# Patient Record
Sex: FEMALE | Race: WHITE | Employment: FULL TIME | URBAN - METROPOLITAN AREA
[De-identification: names, ages, dates, MRNs, and addresses within clinical notes are randomized per-mention and may not be internally consistent; named-entity substitution may affect disease eponyms.]

---

## 2022-01-05 ENCOUNTER — OFFICE VISIT (OUTPATIENT)
Dept: NEUROLOGY | Age: 67
End: 2022-01-05
Payer: COMMERCIAL

## 2022-01-05 VITALS
OXYGEN SATURATION: 99 % | HEIGHT: 64 IN | RESPIRATION RATE: 14 BRPM | WEIGHT: 132 LBS | HEART RATE: 76 BPM | DIASTOLIC BLOOD PRESSURE: 54 MMHG | SYSTOLIC BLOOD PRESSURE: 119 MMHG | BODY MASS INDEX: 22.53 KG/M2

## 2022-01-05 DIAGNOSIS — G62.9 NEUROPATHY: Primary | ICD-10-CM

## 2022-01-05 PROCEDURE — 99204 OFFICE O/P NEW MOD 45 MIN: CPT | Performed by: SPECIALIST

## 2022-01-05 RX ORDER — BISMUTH SUBSALICYLATE 262 MG
1 TABLET,CHEWABLE ORAL DAILY
COMMUNITY

## 2022-01-05 RX ORDER — GABAPENTIN 600 MG/1
TABLET ORAL
COMMUNITY
Start: 2021-11-22 | End: 2022-01-05 | Stop reason: DRUGHIGH

## 2022-01-05 RX ORDER — TRAZODONE HYDROCHLORIDE 100 MG/1
TABLET ORAL
COMMUNITY
Start: 2021-12-17

## 2022-01-05 RX ORDER — TOPIRAMATE 50 MG/1
50 TABLET, FILM COATED ORAL 2 TIMES DAILY
COMMUNITY

## 2022-01-05 RX ORDER — GABAPENTIN 400 MG/1
CAPSULE ORAL
Qty: 180 CAPSULE | Refills: 3 | Status: SHIPPED | OUTPATIENT
Start: 2022-01-05 | End: 2022-05-06

## 2022-01-05 NOTE — PATIENT INSTRUCTIONS
Patient history reviewed patient examined. On first encounter sounds like a peripheral neuropathy of unknown etiology and would like to get Dr. Amanda Meneses notes on his work-up etc. we will increase the gabapentin to 800 mg 3 times a day to improve pain control. Revisit in about 6 to 8 weeks based on my revisit schedule. Further suggestions could follow.

## 2022-01-05 NOTE — PROGRESS NOTES
Neurology Consult      Subjective:      Erin Aviles is a 61 y.o. female who comes in today on first encounter for evaluation of neuropathy. As I understand it, she works in the [employer]. Was worked up by Dr. Korina Cali over 6 years ago with what sounds like extensive lab work and an EMG and nerve conduction etc.  I am not sure what the official diagnosis beyond neuropathy was- perhaps idiopathic? It is of interest that she does not have diabetes as I understand it, but her mother and 3 siblings do. Her bowel and bladder function is okay and describes the sensibility alterations in the lower extremities is a combination of numbness and occasional superimposed muscle cramps and burning especially at night. Does not smoke does not consume alcohol and does not do street drugs. Currently on gabapentin 600 mg 3 times daily and Topamax 50 mg twice daily is on trazodone 100 mg taking 2 at night. Says special sensory function is intact as well as bowel and bladder and bulbar function. Understand her primary care is done recent lab work. Current Outpatient Medications   Medication Sig Dispense Refill    traZODone (DESYREL) 100 mg tablet TAKE 2 TABLETS BY MOUTH EVERY DAY AT BEDTIME FOR 90 DAYS      topiramate (TOPAMAX) 50 mg tablet Take 50 mg by mouth two (2) times a day.  docusate sodium (STOOL SOFTENER PO) Take  by mouth.  multivitamin (ONE A DAY) tablet Take 1 Tablet by mouth daily.  B cmplx 4/vit D3/C/folic/zinc (VITAL-D RX PO) Take  by mouth.  OTHER       gabapentin (NEURONTIN) 400 mg capsule 2 p.o. 3 times daily  Indications: neuropathic pain 180 Capsule 3      No Known Allergies  No past medical history on file.    Past Surgical History:   Procedure Laterality Date    HX HYSTERECTOMY        Social History     Socioeconomic History    Marital status:      Spouse name: Not on file    Number of children: Not on file    Years of education: Not on file    Highest education level: Not on file   Occupational History    Not on file   Tobacco Use    Smoking status: Never Smoker    Smokeless tobacco: Never Used   Substance and Sexual Activity    Alcohol use: Not Currently    Drug use: Never    Sexual activity: Not on file   Other Topics Concern    Not on file   Social History Narrative    Not on file     Social Determinants of Health     Financial Resource Strain:     Difficulty of Paying Living Expenses: Not on file   Food Insecurity:     Worried About Running Out of Food in the Last Year: Not on file    Myranda of Food in the Last Year: Not on file   Transportation Needs:     Lack of Transportation (Medical): Not on file    Lack of Transportation (Non-Medical): Not on file   Physical Activity:     Days of Exercise per Week: Not on file    Minutes of Exercise per Session: Not on file   Stress:     Feeling of Stress : Not on file   Social Connections:     Frequency of Communication with Friends and Family: Not on file    Frequency of Social Gatherings with Friends and Family: Not on file    Attends Amish Services: Not on file    Active Member of [address] [place] [address] or Organizations: Not on file    Attends Club or Organization Meetings: Not on file    Marital Status: Not on file   Intimate Partner Violence:     Fear of Current or Ex-Partner: Not on file    Emotionally Abused: Not on file    Physically Abused: Not on file    Sexually Abused: Not on file   Housing Stability:     Unable to Pay for Housing in the Last Year: Not on file    Number of [place] in the Last Year: Not on file    Unstable Housing in the Last Year: Not on file      No family history on file.    Visit Vitals  BP (!) 119/54 (BP 1 Location: Left upper arm, BP Patient Position: Sitting)   Pulse 76   Resp 14   Ht 5' 4\" (1.626 m)   Wt 59.9 kg (132 lb)   SpO2 99%   BMI 22.66 kg/m²        Review of Systems:   A comprehensive review of systems was negative except for that written in the HPI.      Neuro Exam:     Appearance: The patient is well developed, well nourished, provides a coherent history and is in no acute distress. Mental Status: Oriented to time, place and person. Mood and affect appropriate. Cranial Nerves:   Intact visual fields. Fundi are benign. MIKEY, EOM's full, no nystagmus, no ptosis. Facial sensation is normal. Corneal reflexes are intact. Facial movement is symmetric. Hearing is normal bilaterally. Palate is midline with normal sternocleidomastoid and trapezius muscles are normal. Tongue is midline. Motor:  5/5 strength in upper and in the lower proximal and distal muscles she averages 5-/5. Normal bulk and tone. No fasciculations. Reflexes:   Deep tendon reflexes 2+/4 and symmetrical except for +1 knee jerks and with Jendrassik maneuver trace ankle jerks. Sensory:    Diminished distally to touch, pinprick and vibration. Position sense intact. Gait:   Patient has a notable right leg limp as she moves from one step to the other although she says it is her left foot that is symptomatic. It may be a type of accommodative posturing with left foot pain? Tremor:   No tremor noted. Cerebellar:  No cerebellar signs present. Neurovascular:  Normal heart sounds and regular rhythm, peripheral pulses intact, and no carotid bruits. Straight leg raising -90 degrees. No clonus no Irizarry's. Assessment:   Neuropathy no doubt idiopathic. Before suggesting any additional assessments would like to get the original work-up [facility]. Further suggestions could follow. For the time being we will increase her pain control by going from 600 mg of gabapentin to 800 mg 3 times daily.   Other drug interventions could include in addition nortriptyline Cymbalta Lamictal Trileptal etc.  Will suggest continue with the Topamax 50 mg twice daily and the dose could go higher depending on results/intolerance etc.  It is interesting that she has her mother and 3 siblings with diabetes. Would also like to get recent lab work done by primary care recently. Plan:   Revisit in about 2 months.   Signed by :  Chacha Velásquez MD

## 2022-01-05 NOTE — LETTER
1/5/2022    Patient: Law Wharton   YOB: 1955   Date of Visit: 1/5/2022     Amber Frederick NP  [address] [facility] [phone]  Via Fax: [phone]    Dear Amber Frederick NP,      Thank you for referring Ms. Law Wharton to [facility] for evaluation. My notes for this consultation are attached. If you have questions, please do not hesitate to call me. I look forward to following your patient along with you.       Sincerely,    Aung Posey MD

## 2022-03-07 ENCOUNTER — OFFICE VISIT (OUTPATIENT)
Dept: NEUROLOGY | Age: 67
End: 2022-03-07
Payer: COMMERCIAL

## 2022-03-07 VITALS
DIASTOLIC BLOOD PRESSURE: 60 MMHG | RESPIRATION RATE: 14 BRPM | WEIGHT: 132 LBS | SYSTOLIC BLOOD PRESSURE: 126 MMHG | OXYGEN SATURATION: 98 % | HEIGHT: 64 IN | TEMPERATURE: 98.5 F | HEART RATE: 70 BPM | BODY MASS INDEX: 22.53 KG/M2

## 2022-03-07 DIAGNOSIS — G62.9 NEUROPATHY: Primary | ICD-10-CM

## 2022-03-07 PROCEDURE — 99213 OFFICE O/P EST LOW 20 MIN: CPT | Performed by: SPECIALIST

## 2022-03-07 RX ORDER — NORTRIPTYLINE HYDROCHLORIDE 10 MG/1
10 CAPSULE ORAL
Qty: 30 CAPSULE | Refills: 3 | Status: SHIPPED | OUTPATIENT
Start: 2022-03-07 | End: 2022-07-06

## 2022-03-07 RX ORDER — ESTRADIOL 0.1 MG/D
PATCH, EXTENDED RELEASE TRANSDERMAL
COMMUNITY
Start: 2022-02-11

## 2022-03-07 NOTE — LETTER
3/7/2022    Patient: Dalila Beard   YOB: 1955   Date of Visit: 3/7/2022     Jess Plaza NP  [address] [facility] [phone]  Via Fax: [phone]    Dear Jess Plaza NP,      Thank you for referring Ms. Dalila Beard to [facility] for evaluation. My notes for this consultation are attached. If you have questions, please do not hesitate to call me. I look forward to following your patient along with you.       Sincerely,    Madelyn Silverman MD

## 2022-03-07 NOTE — PATIENT INSTRUCTIONS
Patient history viewed patient examined. Will see if we can add some additional pain relief in the legs with the addition of nortriptyline at night lowest dose. Was warned of sedation. Is already on gabapentin and has Topamax for headache prevention. Revisit in about 2 months.

## 2022-03-07 NOTE — PROGRESS NOTES
Neurology Consult      Subjective:      Yamil Ashby is a 61 y.o. female who comes in today with painful idiopathic polyneuropathy. Was able to get my hands on the remote neurologic work-up with Dr. Drucella Meigs and company at [facility]. Did the usual and customary blood screen for other possibilities and an EMG and nerve conduction as well. Ended up with the idiopathic label. Currently on gabapentin 800 mg 3 times daily. Still has some breakthrough discomfort with the distal legs and feet. We will see if we can add the drug nortriptyline 10 mg at night as a lowest dose and she was warned of sedation. We can go higher on the dose if needed and other possibilities include considerations to Cymbalta Trileptal Lamictal etc. revisit in about 2 months. Current Outpatient Medications   Medication Sig Dispense Refill    Erum 0.1 mg/24 hr APPLY 1 PATCH TOPICALLY TO SKIN TWICE A WEEK      nortriptyline (PAMELOR) 10 mg capsule Take 1 Capsule by mouth nightly. 30 Capsule 3    traZODone (DESYREL) 100 mg tablet TAKE 2 TABLETS BY MOUTH EVERY DAY AT BEDTIME FOR 90 DAYS      topiramate (TOPAMAX) 50 mg tablet Take 50 mg by mouth two (2) times a day.  docusate sodium (STOOL SOFTENER PO) Take  by mouth.  multivitamin (ONE A DAY) tablet Take 1 Tablet by mouth daily.  B cmplx 4/vit D3/C/folic/zinc (VITAL-D RX PO) Take  by mouth.  OTHER       gabapentin (NEURONTIN) 400 mg capsule 2 p.o. 3 times daily  Indications: neuropathic pain 180 Capsule 3      No Known Allergies  No past medical history on file.    Past Surgical History:   Procedure Laterality Date    HX HYSTERECTOMY        Social History     Socioeconomic History    Marital status:      Spouse name: Not on file    Number of children: Not on file    Years of education: Not on file    Highest education level: Not on file   Occupational History    Not on file   Tobacco Use    Smoking status: Never Smoker    Smokeless tobacco: Never Used Substance and Sexual Activity    Alcohol use: Not Currently    Drug use: Never    Sexual activity: Not on file   Other Topics Concern    Not on file   Social History Narrative    Not on file     Social Determinants of Health     Financial Resource Strain:     Difficulty of Paying Living Expenses: Not on file   Food Insecurity:     Worried About Running Out of Food in the Last Year: Not on file    Myranda of Food in the Last Year: Not on file   Transportation Needs:     Lack of Transportation (Medical): Not on file    Lack of Transportation (Non-Medical): Not on file   Physical Activity:     Days of Exercise per Week: Not on file    Minutes of Exercise per Session: Not on file   Stress:     Feeling of Stress : Not on file   Social Connections:     Frequency of Communication with Friends and Family: Not on file    Frequency of Social Gatherings with Friends and Family: Not on file    Attends Mosque Services: Not on file    Active Member of [address] [place] [address] or Organizations: Not on file    Attends Club or Organization Meetings: Not on file    Marital Status: Not on file   Intimate Partner Violence:     Fear of Current or Ex-Partner: Not on file    Emotionally Abused: Not on file    Physically Abused: Not on file    Sexually Abused: Not on file   Housing Stability:     Unable to Pay for Housing in the Last Year: Not on file    Number of [place] in the Last Year: Not on file    Unstable Housing in the Last Year: Not on file      No family history on file. Visit Vitals  /60   Pulse 70   Temp 98.5 °F (36.9 °C) (Temporal)   Resp 14   Ht 5' 4\" (1.626 m)   Wt 59.9 kg (132 lb)   SpO2 98%   BMI 22.66 kg/m²        Review of Systems:   A comprehensive review of systems was negative except for that written in the HPI. Neuro Exam:     Appearance: The patient is well developed, well nourished, provides a coherent history and is in no acute distress.    Mental Status: Oriented to time, place and person. Mood and affect appropriate. Cranial Nerves:   Intact visual fields. Fundi are benign. MIKEY, EOM's full, no nystagmus, no ptosis. Facial sensation is normal. Corneal reflexes are intact. Facial movement is symmetric. Hearing is normal bilaterally. Palate is midline with normal sternocleidomastoid and trapezius muscles are normal. Tongue is midline. Motor:  5/5 strength in upper and lower proximal and distal muscles. Normal bulk and tone. No fasciculations. Reflexes:   Deep tendon reflexes 0-1+/4 and symmetrical.   Sensory:    Diminished distally to touch, pinprick and vibration. Gait:   Patient's transfers and gait taken slow and cautious and deliberate but seemed to be functional.     Tremor:   No tremor noted. Cerebellar:  No cerebellar signs present. Neurovascular:  Normal heart sounds and regular rhythm, peripheral pulses intact, and no carotid bruits. Assessment:   Painful idiopathic polyneuropathy. We will add simple nortriptyline 10 mg at night as a preventative and was warned on sedation. We can obviously work up the dose if we need to. Other previously reference drugs for consideration could include Cymbalta Trileptal Lamictal etc.      Plan:   Revisit in about 2 months.   Signed by :  Reed Greenwood MD

## 2022-05-09 ENCOUNTER — OFFICE VISIT (OUTPATIENT)
Dept: NEUROLOGY | Age: 67
End: 2022-05-09
Payer: COMMERCIAL

## 2022-05-09 VITALS
DIASTOLIC BLOOD PRESSURE: 76 MMHG | WEIGHT: 132 LBS | RESPIRATION RATE: 18 BRPM | HEART RATE: 72 BPM | SYSTOLIC BLOOD PRESSURE: 118 MMHG | BODY MASS INDEX: 22.66 KG/M2 | OXYGEN SATURATION: 99 %

## 2022-05-09 DIAGNOSIS — G62.9 NEUROPATHY: Primary | ICD-10-CM

## 2022-05-09 PROCEDURE — 99213 OFFICE O/P EST LOW 20 MIN: CPT | Performed by: SPECIALIST

## 2022-05-09 NOTE — PATIENT INSTRUCTIONS
Patient will be awaiting a call from her eye doctor on the compatibility of our drug nortriptyline for neuropathy as it goes to her glaucoma. We will not do anything until we hear the same. The revisit will be the same- as it will be determined based on what our next step is in terms of keeping the nortriptyline or going to a different product.

## 2022-05-09 NOTE — LETTER
5/9/2022    Patient: Raj Sheriff   YOB: 1955   Date of Visit: 5/9/2022     Luciana Mares NP  [address]  Via Fax: [phone]    Dear Luciana Mares NP,      Thank you for referring Ms. Raj Sheriff to [facility] for evaluation. My notes for this consultation are attached. If you have questions, please do not hesitate to call me. I look forward to following your patient along with you.       Sincerely,    Thomas Oliveira MD

## 2022-05-09 NOTE — PROGRESS NOTES
Neurology Consult      Subjective:      Dennis Ricci is a 61 y.o. female who comes in today on follow-up of idiopathic painful neuropathy. We used lowest dose nortriptyline 10 mg as a challenge that would complement the gabapentin 800 mg 3 times daily. She thinks that it has significantly diminished the pathologic muscle cramping and was reminded that we can go much higher on the dose if it is approved by her eye doctor on the glaucoma. She will let us know as she is already placed a phone message to the physician as to whether the nortriptyline is compatible for her or not. That in turn will determine when we see her back as the revisit could go longer if we can retain the nortriptyline at a higher dose but shorter if we are forced to look at a new drug in class. She otherwise did not reference any new difficulties. Current Outpatient Medications   Medication Sig Dispense Refill    gabapentin (NEURONTIN) 400 mg capsule TAKE 2 CAPSULES BY MOUTH THREE TIMES DAILY FOR  NEUROPATHIC  PAIN 180 Capsule 0    Erum 0.1 mg/24 hr APPLY 1 PATCH TOPICALLY TO SKIN TWICE A WEEK      nortriptyline (PAMELOR) 10 mg capsule Take 1 Capsule by mouth nightly. 30 Capsule 3    traZODone (DESYREL) 100 mg tablet TAKE 2 TABLETS BY MOUTH EVERY DAY AT BEDTIME FOR 90 DAYS      topiramate (TOPAMAX) 50 mg tablet Take 50 mg by mouth two (2) times a day.  docusate sodium (STOOL SOFTENER PO) Take  by mouth.  multivitamin (ONE A DAY) tablet Take 1 Tablet by mouth daily.  B cmplx 4/vit D3/C/folic/zinc (VITAL-D RX PO) Take  by mouth.  OTHER         No Known Allergies  No past medical history on file.    Past Surgical History:   Procedure Laterality Date    HX HYSTERECTOMY        Social History     Socioeconomic History    Marital status:      Spouse name: Not on file    Number of children: Not on file    Years of education: Not on file    Highest education level: Not on file   Occupational History    Not on file   Tobacco Use    Smoking status: Never Smoker    Smokeless tobacco: Never Used   Substance and Sexual Activity    Alcohol use: Not Currently    Drug use: Never    Sexual activity: Not on file   Other Topics Concern    Not on file   Social History Narrative    Not on file     Social Determinants of Health     Financial Resource Strain:     Difficulty of Paying Living Expenses: Not on file   Food Insecurity:     Worried About Running Out of Food in the Last Year: Not on file    Myranda of Food in the Last Year: Not on file   Transportation Needs:     Lack of Transportation (Medical): Not on file    Lack of Transportation (Non-Medical): Not on file   Physical Activity:     Days of Exercise per Week: Not on file    Minutes of Exercise per Session: Not on file   Stress:     Feeling of Stress : Not on file   Social Connections:     Frequency of Communication with Friends and Family: Not on file    Frequency of Social Gatherings with Friends and Family: Not on file    Attends Faith Services: Not on file    Active Member of [address] [place] [address] or Organizations: Not on file    Attends Club or Organization Meetings: Not on file    Marital Status: Not on file   Intimate Partner Violence:     Fear of Current or Ex-Partner: Not on file    Emotionally Abused: Not on file    Physically Abused: Not on file    Sexually Abused: Not on file   Housing Stability:     Unable to Pay for Housing in the Last Year: Not on file    Number of [place] in the Last Year: Not on file    Unstable Housing in the Last Year: Not on file      No family history on file. Visit Vitals  /76 (BP 1 Location: Left arm, BP Patient Position: Sitting, BP Cuff Size: Adult)   Pulse 72   Resp 18   Wt 59.9 kg (132 lb)   SpO2 99%   BMI 22.66 kg/m²        Review of Systems:   A comprehensive review of systems was negative except for that written in the HPI. Neuro Exam:     Appearance:   The patient is well developed, well nourished, provides a coherent history and is in no acute distress. Mental Status: Oriented to time, place and person. Mood and affect appropriate. Cranial Nerves:   Intact visual fields. Fundi are benign. MIKEY, EOM's full, no nystagmus, no ptosis. Facial sensation is normal. Corneal reflexes are intact. Facial movement is symmetric. Hearing is normal bilaterally. Palate is midline with normal sternocleidomastoid and trapezius muscles are normal. Tongue is midline. Motor:  5/5 strength in upper and lower proximal and distal muscles. Normal bulk and tone. No fasciculations. Reflexes:   Deep tendon reflexes 0-1+/4 and symmetrical.   Sensory:    Diminished distally to touch, pinprick and vibration. Gait:  Normal gait but slightly cautious step to step. Tremor:   No tremor noted. Cerebellar:  No cerebellar signs present. Neurovascular:  Normal heart sounds and regular rhythm, peripheral pulses intact, and no carotid bruits. Assessment:   Idiopathic neuropathy. We will keep the nortriptyline 10 mg in place for now and the patient has a phone call into her eye doctor regarding the compatibility of that drug with her glaucoma or not. And next step will be predicated on that phone call and she says she will call us and let us know how that goes. We have plenty of room to go higher and apparently the drug has initially decreased on the uncomfortable leg spasms etc.  Otherwise exam appears to be baseline. Plan: Will pend revisit in the meantime.   Signed by :  Swathi Madrid MD

## 2022-06-06 NOTE — PROGRESS NOTES
I just received a call from Dr. Gladys Arnett the patient's eye doctor. On the last visit we had a little discussion about her medications and one of them was to the nortriptyline 10 mg she takes to help suppress neuromuscular symptoms. She is a patient with glaucoma followed by Dr. Noris Caruso. He did a check on her case and said there is currently no issue with her eyes and the nortriptyline. That is certainly reassuring as I hoped it would be. Appreciate the time for the phone call and can address that with her if it comes up again.   DEMAR BAIRES.

## 2022-08-09 DIAGNOSIS — G62.9 NEUROPATHY: ICD-10-CM

## 2022-08-09 RX ORDER — GABAPENTIN 400 MG/1
CAPSULE ORAL
Qty: 180 CAPSULE | Refills: 1 | Status: CANCELLED | OUTPATIENT
Start: 2022-08-09

## 2022-08-29 ENCOUNTER — OFFICE VISIT (OUTPATIENT)
Dept: NEUROLOGY | Age: 67
End: 2022-08-29
Payer: COMMERCIAL

## 2022-08-29 VITALS — SYSTOLIC BLOOD PRESSURE: 112 MMHG | HEART RATE: 74 BPM | DIASTOLIC BLOOD PRESSURE: 68 MMHG

## 2022-08-29 DIAGNOSIS — G62.9 NEUROPATHY: Primary | ICD-10-CM

## 2022-08-29 PROCEDURE — 99213 OFFICE O/P EST LOW 20 MIN: CPT | Performed by: SPECIALIST

## 2022-08-29 PROCEDURE — 1123F ACP DISCUSS/DSCN MKR DOCD: CPT | Performed by: SPECIALIST

## 2022-08-29 RX ORDER — NORTRIPTYLINE HYDROCHLORIDE 25 MG/1
25 CAPSULE ORAL
Qty: 30 CAPSULE | Refills: 3 | Status: SHIPPED | OUTPATIENT
Start: 2022-08-29

## 2022-08-29 RX ORDER — LATANOPROST 50 UG/ML
SOLUTION/ DROPS OPHTHALMIC
COMMUNITY
Start: 2022-06-02

## 2022-08-29 NOTE — PROGRESS NOTES
Neurology Consult      Subjective:      Nayeli Chan is a 74 y.o. female who comes in today with length dependent idiopathic painful neuropathy. So far no problem with the nortriptyline 10 mg but unfortunately no real gains in control of pain. We will increase it from 10 to 25 mg and is welcome to give me a call in about 2 weeks to see if we have accomplish anything or produced sedation and/or fatigue. Need to recall that she is already on gabapentin and a good dose of that in addition to Topamax. No history of falls. Suggestions that we catch up with each other in about 2 months. Please see other agents below that we can resort to if and as needed. Made no new commentary today as to concerns diagnoses etc. etc.         Current Outpatient Medications   Medication Sig Dispense Refill    latanoprost (XALATAN) 0.005 % ophthalmic solution INSTILL 1 DROP INTO EACH EYE ONCE DAILY AT BEDTIME      nortriptyline (PAMELOR) 25 mg capsule Take 1 Capsule by mouth nightly. 30 Capsule 3    gabapentin (NEURONTIN) 400 mg capsule TAKE 2 CAPSULES BY MOUTH THREE TIMES DAILY FOR PAIN 180 Capsule 1    Erum 0.1 mg/24 hr APPLY 1 PATCH TOPICALLY TO SKIN TWICE A WEEK      traZODone (DESYREL) 100 mg tablet TAKE 2 TABLETS BY MOUTH EVERY DAY AT BEDTIME FOR 90 DAYS      topiramate (TOPAMAX) 50 mg tablet Take 50 mg by mouth two (2) times a day. docusate sodium (STOOL SOFTENER PO) Take  by mouth.      multivitamin (ONE A DAY) tablet Take 1 Tablet by mouth daily. B cmplx 4/vit D3/C/folic/zinc (VITAL-D RX PO) Take  by mouth. OTHER         No Known Allergies  No past medical history on file.    Past Surgical History:   Procedure Laterality Date    HX HYSTERECTOMY        Social History     Socioeconomic History    Marital status:      Spouse name: Not on file    Number of children: Not on file    Years of education: Not on file    Highest education level: Not on file   Occupational History    Not on file   Tobacco Use Smoking status: Never    Smokeless tobacco: Never   Substance and Sexual Activity    Alcohol use: Not Currently    Drug use: Never    Sexual activity: Not on file   Other Topics Concern    Not on file   Social History Narrative    Not on file     Social Determinants of Health     Financial Resource Strain: Not on file   Food Insecurity: Not on file   Transportation Needs: Not on file   Physical Activity: Not on file   Stress: Not on file   Social Connections: Not on file   Intimate Partner Violence: Not on file   Housing Stability: Not on file      No family history on file. Visit Vitals  /68 (BP 1 Location: Left upper arm, BP Patient Position: Sitting, BP Cuff Size: Adult)   Pulse 74        Review of Systems:   A comprehensive review of systems was negative except for that written in the HPI. Neuro Exam:     Appearance: The patient is well developed, well nourished, provides a coherent history and is in no acute distress. Mental Status: Oriented to time, place and person. Mood and affect appropriate. Cranial Nerves:   Intact visual fields. Fundi are benign. MIKEY, EOM's full, no nystagmus, no ptosis. Facial sensation is normal. Corneal reflexes are intact. Facial movement is symmetric. Hearing is normal bilaterally. Palate is midline with normal sternocleidomastoid and trapezius muscles are normal. Tongue is midline. Motor:  5/5 strength in upper and lower proximal and distal muscles. Normal bulk and tone. No fasciculations. Reflexes:   Deep tendon reflexes 1-2+/4 and symmetrical.   Sensory:   Length dependent deficiency and sensation to touch, pinprick and vibration. Gait:  Normal gait. Tremor:   No tremor noted. Cerebellar:  No cerebellar signs present. Neurovascular:  Normal heart sounds and regular rhythm, peripheral pulses intact, and no carotid bruits. Assessment:   Neuropathy.   We will increase the dose of nortriptyline to 25 mg and hope for improvement and not a prohibitive overwhelming sleepiness fatigue issue. Have to respect the fact that she is already on a good dose of gabapentin and Topamax in addition. Other agents could include Cymbalta doxepin Tegretol Trileptal Lamictal among other agents. Is certainly welcome to call me in 2 weeks or so to let me know whether the medicine has improved her pain control and/or cause too much prohibitive sedation and/or fatigue. Revisit in 2 months. Plan:   Revisit 2 months.   Signed by :  Isamar Baron MD

## 2022-08-29 NOTE — LETTER
8/29/2022    Patient: William Devries   YOB: 1955   Date of Visit: 8/29/2022     Rosanna Del Valle NP  [address] [facility] [address]  Via Fax: [phone]    Dear Rosanna Del Valle NP,      Thank you for referring Ms. William Devries to [facility] for evaluation. My notes for this consultation are attached. If you have questions, please do not hesitate to call me. I look forward to following your patient along with you.       Sincerely,    Mike Heaton MD

## 2022-08-29 NOTE — PATIENT INSTRUCTIONS
Patient history viewed patient examined. We will cautiously increase nortriptyline to 25 mg and once again warned about potential sedation. We will keep the gabapentin as it is and is already on Topamax for headache control which could also be assisting with nerve pain control as well. Is welcome to call me in 2 to 3 weeks if the new dose of nortriptyline is not helping with the discomfort in the legs and or is causing prohibitive sedation etc. revisit in 2 months.

## 2022-10-10 DIAGNOSIS — G62.9 NEUROPATHY: ICD-10-CM

## 2022-10-11 RX ORDER — GABAPENTIN 400 MG/1
CAPSULE ORAL
Qty: 180 CAPSULE | Refills: 0 | Status: SHIPPED | OUTPATIENT
Start: 2022-10-11

## 2022-11-04 ENCOUNTER — OFFICE VISIT (OUTPATIENT)
Dept: NEUROLOGY | Age: 67
End: 2022-11-04
Payer: COMMERCIAL

## 2022-11-04 VITALS
HEIGHT: 64 IN | HEART RATE: 79 BPM | SYSTOLIC BLOOD PRESSURE: 125 MMHG | DIASTOLIC BLOOD PRESSURE: 58 MMHG | WEIGHT: 132 LBS | OXYGEN SATURATION: 99 % | RESPIRATION RATE: 18 BRPM | BODY MASS INDEX: 22.53 KG/M2

## 2022-11-04 DIAGNOSIS — G62.9 NEUROPATHY: Primary | ICD-10-CM

## 2022-11-04 PROCEDURE — 99213 OFFICE O/P EST LOW 20 MIN: CPT | Performed by: SPECIALIST

## 2022-11-04 PROCEDURE — 1123F ACP DISCUSS/DSCN MKR DOCD: CPT | Performed by: SPECIALIST

## 2022-11-04 NOTE — PROGRESS NOTES
Visit Vitals  BP (!) 125/58 (BP 1 Location: Right upper arm, BP Patient Position: Sitting, BP Cuff Size: Adult)   Pulse 79   Resp 18   Ht 5' 4\" (1.626 m)   Wt 132 lb (59.9 kg)   SpO2 99%   BMI 22.66 kg/m²     Chief Complaint   Patient presents with    Follow-up     Patient is here to follow up for neuropathy . She reports that she is still having the leg pain. Right leg hurts more than the other. By the end of her work shift her legs just hurt to touch them.

## 2022-11-04 NOTE — PATIENT INSTRUCTIONS
Patient history viewed patient examined. Will recommend continuation of her medication and we potentially could go to a higher dose of nortriptyline for better pain control if needed and gabapentin I would like to keep at the current dosing. Topamax conceivably could be increased as well. Is currently nursing a symptomatic right knee and hopefully with her appointment in January we will get some good direction on treatment. Suggest revisit in 3 months. If the weather is inclement by all means reschedule. Have great holidays.

## 2022-11-04 NOTE — PROGRESS NOTES
Neurology Consult      Subjective:      Tobi Eddy is a 74 y.o. female who comes in today on regular follow-up for neuropathic pain from idiopathic neuropathy. Is currently on gabapentin 800 mg 3 times a day and on Pamelor 25 mg nightly and Topamax 50 mg twice daily. Have potential room for dose escalation on the Pamelor and Topamax for future consideration. Will let me know if that needs to be the case. I think the gabapentin as dosed is probably a very convenient dose as is. Currently working on steady-state pain in the right knee and will be seeing rheumatology in January as I understand it. Hopefully that will be additional comfort and a smoother gait performance in the process. Was limping on the right leg and some hesitancy step to step on today's visit. Currently no falls but has had occasions where she would second-guess her maneuvers on her feet especially at work. Revisit 3 months. Current Outpatient Medications   Medication Sig Dispense Refill    gabapentin (NEURONTIN) 400 mg capsule TAKE 2 CAPSULES BY MOUTH THREE TIMES DAILY FOR PAIN 180 Capsule 0    latanoprost (XALATAN) 0.005 % ophthalmic solution INSTILL 1 DROP INTO EACH EYE ONCE DAILY AT BEDTIME      nortriptyline (PAMELOR) 25 mg capsule Take 1 Capsule by mouth nightly. 30 Capsule 3    Erum 0.1 mg/24 hr APPLY 1 PATCH TOPICALLY TO SKIN TWICE A WEEK      traZODone (DESYREL) 100 mg tablet TAKE 2 TABLETS BY MOUTH EVERY DAY AT BEDTIME FOR 90 DAYS      topiramate (TOPAMAX) 50 mg tablet Take 50 mg by mouth two (2) times a day. docusate sodium (STOOL SOFTENER PO) Take  by mouth.      multivitamin (ONE A DAY) tablet Take 1 Tablet by mouth daily. B cmplx 4/vit D3/C/folic/zinc (VITAL-D RX PO) Take  by mouth. OTHER         No Known Allergies  No past medical history on file.    Past Surgical History:   Procedure Laterality Date    HX HYSTERECTOMY        Social History     Socioeconomic History    Marital status:  Spouse name: Not on file    Number of children: Not on file    Years of education: Not on file    Highest education level: Not on file   Occupational History    Not on file   Tobacco Use    Smoking status: Never    Smokeless tobacco: Never   Substance and Sexual Activity    Alcohol use: Not Currently    Drug use: Never    Sexual activity: Not on file   Other Topics Concern    Not on file   Social History Narrative    Not on file     Social Determinants of Health     Financial Resource Strain: Not on file   Food Insecurity: Not on file   Transportation Needs: Not on file   Physical Activity: Not on file   Stress: Not on file   Social Connections: Not on file   Intimate Partner Violence: Not on file   Housing Stability: Not on file      No family history on file. Visit Vitals  BP (!) 125/58 (BP 1 Location: Right upper arm, BP Patient Position: Sitting, BP Cuff Size: Adult)   Pulse 79   Resp 18   Ht 5' 4\" (1.626 m)   Wt 59.9 kg (132 lb)   SpO2 99%   BMI 22.66 kg/m²        Review of Systems:   A comprehensive review of systems was negative except for that written in the HPI. Neuro Exam:     Appearance: The patient is well developed, well nourished, provides a coherent history and is in no acute distress. Mental Status: Oriented to time, place and person. Mood and affect appropriate. Cranial Nerves:   Intact visual fields. Fundi are benign. MIKEY, EOM's full, no nystagmus, no ptosis. Facial sensation is normal. Corneal reflexes are intact. Facial movement is symmetric. Hearing is normal bilaterally. Palate is midline with normal sternocleidomastoid and trapezius muscles are normal. Tongue is midline. Motor:  5/5 strength in upper and lower proximal and distal muscles. Normal bulk and tone. No fasciculations. Reflexes:   Deep tendon reflexes 1-2+/4 and symmetrical.   Sensory:   Length dependent sensory changes in the legs to touch, pinprick and vibration.    Gait:  Patient gait remarkable for a fairly consistent right leg hesitancy and limping secondary to knee discomfort etc.    Tremor:   No tremor noted. Cerebellar:  No cerebellar signs present. Neurovascular:  Normal heart sounds and regular rhythm, peripheral pulses intact, and no carotid bruits. Assessment:   Idiopathic neuropathy. As per the above discussion we do have some room on the nortriptyline and potentially Topamax to go higher. Other potential options for the future would include Cymbalta doxepin Tegretol Trileptal Lamictal as considerations. Will let me know if she is interested in a rate increase in the above medicines. Also has a symptomatic right knee and will be seeing rheumatology in January as I understand it. Plan:   Revisit 3 months. If the weather is less than best by all means reschedule.   Signed by :  Parul Vasques MD

## 2022-11-04 NOTE — LETTER
11/4/2022    Patient: Nasreen Shultz   YOB: 1955   Date of Visit: 11/4/2022     Chiqui Lyman NP  [address] [facility] [id number]  Via Fax: [phone]    Dear Chiqui Lyman NP,      Thank you for referring Ms. Nasreen Shultz to [facility] for evaluation. My notes for this consultation are attached. If you have questions, please do not hesitate to call me. I look forward to following your patient along with you.       Sincerely,    Jayjay Landers MD

## 2022-11-10 DIAGNOSIS — G62.9 NEUROPATHY: ICD-10-CM

## 2022-11-11 RX ORDER — GABAPENTIN 400 MG/1
CAPSULE ORAL
Qty: 180 CAPSULE | Refills: 0 | Status: SHIPPED | OUTPATIENT
Start: 2022-11-11

## 2023-02-03 ENCOUNTER — OFFICE VISIT (OUTPATIENT)
Dept: NEUROLOGY | Age: 68
End: 2023-02-03
Payer: COMMERCIAL

## 2023-02-03 VITALS
RESPIRATION RATE: 14 BRPM | OXYGEN SATURATION: 94 % | HEART RATE: 81 BPM | DIASTOLIC BLOOD PRESSURE: 70 MMHG | SYSTOLIC BLOOD PRESSURE: 130 MMHG

## 2023-02-03 DIAGNOSIS — G62.9 NEUROPATHY: Primary | ICD-10-CM

## 2023-02-03 RX ORDER — NORTRIPTYLINE HYDROCHLORIDE 50 MG/1
50 CAPSULE ORAL
Qty: 30 CAPSULE | Refills: 3 | Status: SHIPPED | OUTPATIENT
Start: 2023-02-03

## 2023-02-03 NOTE — LETTER
2/3/2023    Patient: mAber Gutierrez   YOB: 1955   Date of Visit: 2/3/2023     Jb Hdz NP  [address] [facility] [address] [address]  Via Fax: [phone]    Dear Jb Hdz NP,      Thank you for referring Ms. Amber Gutierrez to [facility] for evaluation. My notes for this consultation are attached. If you have questions, please do not hesitate to call me. I look forward to following your patient along with you.       Sincerely,    Dominga Wall MD

## 2023-02-03 NOTE — PROGRESS NOTES
Neurology Consult      Subjective:      Law Wharton is a 74 y.o. female who comes in today on follow-up of idiopathic neuropathy. Overall doing well except sometimes at night she will notice some self-limited cramping especially in the distal lower legs and feet. That certainly is a legitimate feature of neuropathy. Among the medicines she takes- between gabapentin Topamax and Pamelor I will increase the Pamelor from 25 to 50 mg at night. She has been warned it may cause sedation but hopefully it is a feature at night and no other times. Another option could be baclofen for future reference. Exam looks baseline today. Reminds me she does a lot of physical activity at work and that we will have pay offs Axel to her physical fitness and cognitive capabilities as well. No history of falls. Revisit in 3 months. Current Outpatient Medications   Medication Sig Dispense Refill    nortriptyline (PAMELOR) 50 mg capsule Take 1 Capsule by mouth nightly. Indications: Neuropathic pain 30 Capsule 3    gabapentin (NEURONTIN) 400 mg capsule TAKE 2 CAPSULES BY MOUTH THREE TIMES DAILY FOR PAIN 180 Capsule 2    latanoprost (XALATAN) 0.005 % ophthalmic solution INSTILL 1 DROP INTO EACH EYE ONCE DAILY AT BEDTIME      Erum 0.1 mg/24 hr APPLY 1 PATCH TOPICALLY TO SKIN TWICE A WEEK      traZODone (DESYREL) 100 mg tablet TAKE 2 TABLETS BY MOUTH EVERY DAY AT BEDTIME FOR 90 DAYS      topiramate (TOPAMAX) 50 mg tablet Take 50 mg by mouth two (2) times a day. docusate sodium (STOOL SOFTENER PO) Take  by mouth.      multivitamin (ONE A DAY) tablet Take 1 Tablet by mouth daily. B cmplx 4/vit D3/C/folic/zinc (VITAL-D RX PO) Take  by mouth. OTHER         No Known Allergies  No past medical history on file.    Past Surgical History:   Procedure Laterality Date    HX HYSTERECTOMY        Social History     Socioeconomic History    Marital status:      Spouse name: Not on file    Number of children: Not on file Years of education: Not on file    Highest education level: Not on file   Occupational History    Not on file   Tobacco Use    Smoking status: Never    Smokeless tobacco: Never   Substance and Sexual Activity    Alcohol use: Not Currently    Drug use: Never    Sexual activity: Not on file   Other Topics Concern    Not on file   Social History Narrative    Not on file     Social Determinants of Health     Financial Resource Strain: Not on file   Food Insecurity: Not on file   Transportation Needs: Not on file   Physical Activity: Not on file   Stress: Not on file   Social Connections: Not on file   Intimate Partner Violence: Not on file   Housing Stability: Not on file      No family history on file. Visit Vitals  /70 (BP 1 Location: Left arm, BP Patient Position: Sitting)   Pulse 81   Resp 14   SpO2 94%        Review of Systems:   A comprehensive review of systems was negative except for that written in the HPI. Neuro Exam:     Appearance: The patient is well developed, well nourished, provides a coherent history and is in no acute distress. Mental Status: Oriented to time, place and person. Mood and affect appropriate. Cranial Nerves:   Intact visual fields. Fundi are benign. MIKEY, EOM's full, no nystagmus, no ptosis. Facial sensation is normal. Corneal reflexes are intact. Facial movement is symmetric. Hearing is normal bilaterally. Palate is midline with normal sternocleidomastoid and trapezius muscles are normal. Tongue is midline. Motor:  5/5 strength in upper and lower proximal and distal muscles. Normal bulk and tone. No fasciculations. Reflexes:   Deep tendon reflexes 0-1+/4 and symmetrical.   Sensory:   Length dependent sensory changes especially in the lower extremities to touch, pinprick and vibration. Gait:  Normal gait. Romberg negative   Tremor:   No tremor noted. Cerebellar:  No cerebellar signs present.    Neurovascular:  Normal heart sounds and regular rhythm, peripheral pulses intact, and no carotid bruits. Assessment:   Idiopathic neuropathy. We will keep the gabapentin and Topamax as is and will increase the nortriptyline from 25 to 50 mg at night. Was warned about sedation but hopefully will enhance sleep and no next day issues. Her job entails a lot of physical activity that helps her neuropathy and physical fitness as well. Plan:   Revisit 3 months.   Signed by :  Robin Mccoy MD

## 2023-02-03 NOTE — PATIENT INSTRUCTIONS
Patient history viewed patient examined. Will increase the nortriptyline at night to see if it helps improve cramping and other features of her neuropathy. Otherwise we will keep the gabapentin and Topamax as is. The physical activity level at work certainly will pay good dividends down the road. Revisit in 3 months and good luck.

## 2023-05-04 ENCOUNTER — OFFICE VISIT (OUTPATIENT)
Dept: NEUROLOGY | Age: 68
End: 2023-05-04

## 2023-05-04 VITALS
HEIGHT: 64 IN | SYSTOLIC BLOOD PRESSURE: 110 MMHG | DIASTOLIC BLOOD PRESSURE: 70 MMHG | OXYGEN SATURATION: 98 % | RESPIRATION RATE: 18 BRPM | BODY MASS INDEX: 22.2 KG/M2 | WEIGHT: 130 LBS | HEART RATE: 88 BPM

## 2023-05-04 DIAGNOSIS — G62.9 NEUROPATHY: Primary | ICD-10-CM

## 2023-05-04 RX ORDER — DULOXETIN HYDROCHLORIDE 30 MG/1
30 CAPSULE, DELAYED RELEASE ORAL DAILY
Qty: 90 CAPSULE | Refills: 2 | Status: SHIPPED | OUTPATIENT
Start: 2023-05-04

## 2023-06-08 ENCOUNTER — TELEPHONE (OUTPATIENT)
Age: 68
End: 2023-06-08

## 2023-06-08 DIAGNOSIS — G62.9 POLYNEUROPATHY, UNSPECIFIED: Primary | ICD-10-CM

## 2023-06-08 RX ORDER — NORTRIPTYLINE HYDROCHLORIDE 50 MG/1
50 CAPSULE ORAL NIGHTLY
Qty: 30 CAPSULE | Refills: 5 | Status: SHIPPED | OUTPATIENT
Start: 2023-06-08

## 2023-06-08 RX ORDER — NORTRIPTYLINE HYDROCHLORIDE 50 MG/1
50 CAPSULE ORAL NIGHTLY
Qty: 90 CAPSULE | Refills: 1 | OUTPATIENT
Start: 2023-06-08

## 2023-06-08 NOTE — TELEPHONE ENCOUNTER
Benjamin Talley NP sent medication refill Nortriptyline to [address] [facility] in [place].

## 2023-06-22 DIAGNOSIS — G62.9 POLYNEUROPATHY, UNSPECIFIED: Primary | ICD-10-CM

## 2023-06-22 DIAGNOSIS — G62.9 POLYNEUROPATHY, UNSPECIFIED: ICD-10-CM

## 2023-06-22 RX ORDER — GABAPENTIN 400 MG/1
CAPSULE ORAL
Qty: 180 CAPSULE | Refills: 2 | Status: SHIPPED | OUTPATIENT
Start: 2023-06-22 | End: 2023-06-22 | Stop reason: SDUPTHER

## 2023-06-22 RX ORDER — GABAPENTIN 400 MG/1
CAPSULE ORAL
Qty: 180 CAPSULE | Refills: 2 | Status: SHIPPED | OUTPATIENT
Start: 2023-06-22 | End: 2023-09-22

## 2023-07-13 ENCOUNTER — OFFICE VISIT (OUTPATIENT)
Age: 68
End: 2023-07-13
Payer: COMMERCIAL

## 2023-07-13 VITALS
TEMPERATURE: 97.5 F | HEART RATE: 75 BPM | RESPIRATION RATE: 20 BRPM | OXYGEN SATURATION: 98 % | SYSTOLIC BLOOD PRESSURE: 122 MMHG | DIASTOLIC BLOOD PRESSURE: 74 MMHG

## 2023-07-13 DIAGNOSIS — G62.9 POLYNEUROPATHY, UNSPECIFIED: ICD-10-CM

## 2023-07-13 PROCEDURE — 1123F ACP DISCUSS/DSCN MKR DOCD: CPT

## 2023-07-13 PROCEDURE — 99214 OFFICE O/P EST MOD 30 MIN: CPT

## 2023-07-13 RX ORDER — TOPIRAMATE 50 MG/1
50 TABLET, FILM COATED ORAL 2 TIMES DAILY
Qty: 60 TABLET | Refills: 5 | Status: SHIPPED | OUTPATIENT
Start: 2023-07-13

## 2023-07-13 NOTE — PROGRESS NOTES
Tried the cymbalta for almost 2 months and couldn't take it anymore it made everything taste nasty especially water and made the food taste nasty
acuity grossly intact. Visual fields are normal.    Pupils are equal, round, and reactive to light and accommodation. Extra-ocular movements are full and fluid. Fundoscopic exam was benign, no ptosis or nystagmus. V-XII: Hearing is grossly intact. Facial features are symmetric, with normal sensation and strength. The palate rises symmetrically and the tongue protrudes midline. Sternocleidomastoids 5/5. Motor Examination: Normal tone, bulk, and strength, 5/5 muscle strength throughout. Coordination: Finger to nose was normal. No resting or intention tremor    Gait and Station: Steady while walking. Normal arm swing. No pronator drift. No muscle wasting or fasiculations noted. Reflexes: DTRs 2+ throughout. Orders Placed This Encounter    topiramate (TOPAMAX) 50 MG tablet     Sig: Take 1 tablet by mouth 2 times daily     Dispense:  60 tablet     Refill:  5        1. Polyneuropathy, unspecified    Discussed the patient increasing her Topamax back up to 50 mg twice a day, assuring her that this medication is not addictive. Patient agreed. She will continue with gabapentin 400 mg 2 capsules by mouth 3 times a day. Continue nortriptyline 50 mg nightly and she may follow-up in 6 months or sooner if needed.         This note will not be viewable in [id number]

## 2023-10-12 DIAGNOSIS — G62.9 POLYNEUROPATHY, UNSPECIFIED: ICD-10-CM

## 2023-10-13 RX ORDER — GABAPENTIN 400 MG/1
CAPSULE ORAL
Qty: 180 CAPSULE | Refills: 3 | Status: SHIPPED | OUTPATIENT
Start: 2023-10-13 | End: 2023-11-10

## 2023-11-22 ENCOUNTER — APPOINTMENT (OUTPATIENT)
Facility: HOSPITAL | Age: 68
End: 2023-11-22
Payer: COMMERCIAL

## 2023-11-22 ENCOUNTER — HOSPITAL ENCOUNTER (EMERGENCY)
Facility: HOSPITAL | Age: 68
Discharge: HOME | End: 2023-11-22
Attending: EMERGENCY MEDICINE
Payer: COMMERCIAL

## 2023-11-22 ENCOUNTER — NURSE TRIAGE (OUTPATIENT)
Dept: OTHER | Facility: CLINIC | Age: 68
End: 2023-11-22

## 2023-11-22 VITALS
RESPIRATION RATE: 17 BRPM | DIASTOLIC BLOOD PRESSURE: 65 MMHG | SYSTOLIC BLOOD PRESSURE: 122 MMHG | BODY MASS INDEX: 22.88 KG/M2 | HEIGHT: 64 IN | OXYGEN SATURATION: 97 % | WEIGHT: 134 LBS | TEMPERATURE: 97.9 F | HEART RATE: 73 BPM

## 2023-11-22 DIAGNOSIS — H81.11 BPPV (BENIGN PAROXYSMAL POSITIONAL VERTIGO), RIGHT: Primary | ICD-10-CM

## 2023-11-22 DIAGNOSIS — G62.9 POLYNEUROPATHY, UNSPECIFIED: ICD-10-CM

## 2023-11-22 LAB
COMMENT:: NORMAL
SPECIMEN HOLD: NORMAL

## 2023-11-22 PROCEDURE — 6360000002 HC RX W HCPCS: Performed by: EMERGENCY MEDICINE

## 2023-11-22 PROCEDURE — 70551 MRI BRAIN STEM W/O DYE: CPT

## 2023-11-22 PROCEDURE — 6370000000 HC RX 637 (ALT 250 FOR IP): Performed by: EMERGENCY MEDICINE

## 2023-11-22 PROCEDURE — 99284 EMERGENCY DEPT VISIT MOD MDM: CPT

## 2023-11-22 PROCEDURE — 96374 THER/PROPH/DIAG INJ IV PUSH: CPT

## 2023-11-22 RX ORDER — MECLIZINE HYDROCHLORIDE 25 MG/1
50 TABLET ORAL
Status: COMPLETED | OUTPATIENT
Start: 2023-11-22 | End: 2023-11-22

## 2023-11-22 RX ORDER — MECLIZINE HYDROCHLORIDE 25 MG/1
25 TABLET ORAL 3 TIMES DAILY PRN
Qty: 20 TABLET | Refills: 0 | Status: SHIPPED | OUTPATIENT
Start: 2023-11-22 | End: 2023-12-02

## 2023-11-22 RX ORDER — DIAZEPAM 5 MG/1
5 TABLET ORAL EVERY 12 HOURS PRN
Qty: 6 TABLET | Refills: 0 | Status: SHIPPED | OUTPATIENT
Start: 2023-11-22 | End: 2023-12-02

## 2023-11-22 RX ORDER — DIAZEPAM 5 MG/ML
5 INJECTION, SOLUTION INTRAMUSCULAR; INTRAVENOUS ONCE
Status: COMPLETED | OUTPATIENT
Start: 2023-11-22 | End: 2023-11-22

## 2023-11-22 RX ADMIN — MECLIZINE HYDROCHLORIDE 50 MG: 25 TABLET ORAL at 15:23

## 2023-11-22 RX ADMIN — DIAZEPAM 5 MG: 5 INJECTION, SOLUTION INTRAMUSCULAR; INTRAVENOUS at 16:26

## 2023-11-22 ASSESSMENT — PAIN SCALES - GENERAL
PAINLEVEL_OUTOF10: 0
PAINLEVEL_OUTOF10: 0

## 2023-11-22 ASSESSMENT — PAIN - FUNCTIONAL ASSESSMENT
PAIN_FUNCTIONAL_ASSESSMENT: NONE - DENIES PAIN
PAIN_FUNCTIONAL_ASSESSMENT: 0-10
PAIN_FUNCTIONAL_ASSESSMENT: NONE - DENIES PAIN

## 2023-11-22 NOTE — ED PROVIDER NOTES
[facility] EMERGENCY DEPT  EMERGENCY DEPARTMENT ENCOUNTER      Pt Name: Aroldo Mcdonald  MRN: [id number]  [address] [facility] 1955  Date of evaluation: 11/22/2023  Provider: Estela Toro MD    CHIEF COMPLAINT       Chief Complaint   Patient presents with    Dizziness         HISTORY OF PRESENT ILLNESS    80-year-old female presents with dizziness and difficulty walking starting last night. It came on suddenly and has been waxing and waning. She went to work today and felt the dizziness continue. She walked into triage without difficulty. Symptoms are worse with head position on standing and sitting down. Review of External Medical Records:     Nursing Notes were reviewed. REVIEW OF SYSTEMS       Review of Systems    Except as noted above the remainder of the review of systems was reviewed and negative. PAST MEDICAL HISTORY   No past medical history on file. SURGICAL HISTORY       Past Surgical History:   Procedure Laterality Date    HYSTERECTOMY (CERVIX STATUS UNKNOWN)           CURRENT MEDICATIONS       Previous Medications    ESTRADIOL (VIVELLE) 0.1 MG/24HR    APPLY 1 PATCH TOPICALLY TO SKIN TWICE A WEEK    GABAPENTIN (NEURONTIN) 400 MG CAPSULE    TAKE 2 CAPSULES BY MOUTH THREE TIMES DAILY FOR PAIN    LATANOPROST (XALATAN) 0.005 % OPHTHALMIC SOLUTION    INSTILL 1 DROP INTO EACH EYE ONCE DAILY AT BEDTIME    NORTRIPTYLINE (PAMELOR) 50 MG CAPSULE    Take 1 capsule by mouth nightly    TOPIRAMATE (TOPAMAX) 50 MG TABLET    Take 1 tablet by mouth 2 times daily    TRAZODONE (DESYREL) 100 MG TABLET    TAKE 2 TABLETS BY MOUTH EVERY DAY AT BEDTIME FOR 90 DAYS       ALLERGIES     Patient has no allergy information on record. FAMILY HISTORY     No family history on file.        SOCIAL HISTORY       Social History     Socioeconomic History    Marital status:    Tobacco Use    Smoking status: Never    Smokeless tobacco: Never   Substance and Sexual Activity    Alcohol use: Not Currently    Drug use:

## 2023-11-22 NOTE — ED TRIAGE NOTES
Pt arrives to the ER for complaints of dizziness that started yesterday. Pt reports that the dizziness get worse with positions. Pt also reports that she has nausea. Denies any numbness, tingling, changes in speech/vision.

## 2023-11-22 NOTE — TELEPHONE ENCOUNTER
Location of patient: [address] [facility] [place] call from [facility]; Patient with [employer] Complaint requesting to establish care. Subjective: Caller states she has vertigo    Current Symptoms:   Dizzy when bending down the room spins  Walking like \"she's half drunk\"  Stumbles through the house  Headache that's intermittent    Onset:  Last night    Denies  Weakness  Numbness  Speech deficit   Vision changes  SOB  Chest pain        Pain Severity: 4/10    Temperature:  denies    What has been tried: nothing      Recommended disposition: Go to ED Now    Care advice provided, patient verbalizes understanding; denies any other questions or concerns; instructed to call back for any new or worsening symptoms. Patient/caller agrees to proceed to nearest Emergency Department    Attention Provider: Thank you for allowing me to participate in the care of your patient. The patient was connected to triage in response to information provided to the [facility]. Please do not respond through this encounter as the response is not directed to a shared pool.       Reason for Disposition   [1] Dizziness (vertigo) present now AND [2] age > 61   (Exception: Prior doctor or NP/PA evaluation for this AND no different/worse than usual.)    Protocols used: Dizziness - Vertigo-ADULT-

## 2023-11-23 NOTE — ED NOTES
Patient was given discharge paperwork. Discharge paperwork reviewed. Patient has no concerns or questions at this time. Prescriptions reviewed and pharmacy confirmed with patient. Patient's IV line removed.         Bhumi Gonzalez RN  11/22/23 [id number]

## 2023-12-11 RX ORDER — NORTRIPTYLINE HYDROCHLORIDE 50 MG/1
50 CAPSULE ORAL NIGHTLY
Qty: 30 CAPSULE | Refills: 2 | Status: SHIPPED | OUTPATIENT
Start: 2023-12-11

## 2024-01-11 ENCOUNTER — OFFICE VISIT (OUTPATIENT)
Age: 69
End: 2024-01-11
Payer: COMMERCIAL

## 2024-01-11 VITALS
DIASTOLIC BLOOD PRESSURE: 82 MMHG | RESPIRATION RATE: 20 BRPM | SYSTOLIC BLOOD PRESSURE: 140 MMHG | HEART RATE: 91 BPM | OXYGEN SATURATION: 96 %

## 2024-01-11 DIAGNOSIS — M25.531 BILATERAL WRIST PAIN: ICD-10-CM

## 2024-01-11 DIAGNOSIS — M25.532 BILATERAL WRIST PAIN: ICD-10-CM

## 2024-01-11 DIAGNOSIS — G62.9 POLYNEUROPATHY, UNSPECIFIED: Primary | ICD-10-CM

## 2024-01-11 PROCEDURE — 99214 OFFICE O/P EST MOD 30 MIN: CPT

## 2024-01-11 PROCEDURE — 1123F ACP DISCUSS/DSCN MKR DOCD: CPT

## 2024-01-11 NOTE — PROGRESS NOTES
Neuropathy- has been doing fair since her last visit   Her legs bother her a lot   Left arm hurts a lot   Her hands- she can't keep anything in her  she drops everything       
is dropping things a lot.  She says that she has been wearing wrist splints at night for a long time.  She has never had an EMG of the upper extremities to assess for carpal tunnel.    No Known Allergies    Current Outpatient Medications   Medication Sig Dispense Refill    nortriptyline (PAMELOR) 50 MG capsule TAKE 1 CAPSULE BY MOUTH NIGHTLY 30 capsule 2    gabapentin (NEURONTIN) 400 MG capsule TAKE 2 CAPSULES BY MOUTH THREE TIMES DAILY FOR PAIN 180 capsule 3    topiramate (TOPAMAX) 50 MG tablet Take 1 tablet by mouth 2 times daily 60 tablet 5    estradiol (VIVELLE) 0.1 MG/24HR APPLY 1 PATCH TOPICALLY TO SKIN TWICE A WEEK      latanoprost (XALATAN) 0.005 % ophthalmic solution INSTILL 1 DROP INTO EACH EYE ONCE DAILY AT BEDTIME      traZODone (DESYREL) 100 MG tablet TAKE 2 TABLETS BY MOUTH EVERY DAY AT BEDTIME FOR 90 DAYS       No current facility-administered medications for this visit.        Social History     Tobacco Use   Smoking Status Never   Smokeless Tobacco Never       History reviewed. No pertinent past medical history.    Past Surgical History:   Procedure Laterality Date    HYSTERECTOMY (CERVIX STATUS UNKNOWN)         History reviewed. No pertinent family history.    Social History     Socioeconomic History    Marital status:      Spouse name: None    Number of children: None    Years of education: None    Highest education level: None   Tobacco Use    Smoking status: Never    Smokeless tobacco: Never   Substance and Sexual Activity    Alcohol use: Not Currently    Drug use: Never       Review of Systems   Constitutional: Negative.    Neurological:  Positive for numbness (Fingertips).        Paresthesias in her bilateral legs         Remainder of comprehensive review is negative.     Physical Exam :    BP (!) 140/82 (Site: Left Upper Arm, Position: Sitting, Cuff Size: Large Adult)   Pulse 91   Resp 20   SpO2 96%     General: Well defined, nourished, and groomed individual in no acute distress.

## 2024-01-28 DIAGNOSIS — G62.9 POLYNEUROPATHY, UNSPECIFIED: ICD-10-CM

## 2024-01-28 RX ORDER — TOPIRAMATE 50 MG/1
50 TABLET, FILM COATED ORAL 2 TIMES DAILY
Qty: 60 TABLET | Refills: 0 | Status: SHIPPED | OUTPATIENT
Start: 2024-01-28

## 2024-01-29 RX ORDER — DULOXETIN HYDROCHLORIDE 30 MG/1
30 CAPSULE, DELAYED RELEASE ORAL DAILY
Qty: 90 CAPSULE | Refills: 0 | Status: SHIPPED | OUTPATIENT
Start: 2024-01-29

## 2024-01-31 ENCOUNTER — PROCEDURE VISIT (OUTPATIENT)
Age: 69
End: 2024-01-31

## 2024-01-31 DIAGNOSIS — M25.532 BILATERAL WRIST PAIN: Primary | ICD-10-CM

## 2024-01-31 DIAGNOSIS — M25.531 BILATERAL WRIST PAIN: Primary | ICD-10-CM

## 2024-01-31 NOTE — PROGRESS NOTES
EMG/ NCS Report  [facility] [address], [address]  [place]  [address]   Ph: [phone]   FAX: [phone]/ [phone]    Test Date:  2024    Patient: Veronica Pearson : 1955 Physician: Keith Bhatia MD   ID#: [id number] SEX: Female Ref. Phys: Marivel Stone NP   Tech: Marcia Bentley    Patient History / Exam:  Patient complaining of bilateral wrist and hand numbness and tingling and dropping things. Assess for neuropathy.    EMG & NCV Findings:  Sensory and motor nerve conduction studies (as indicated in the tables) were within reference of normal.      All F Wave latencies were within normal limits.  All F Wave left vs. right side latency differences were within normal limits.      Disposable concentric needle EMG (as indicated in the table) showed no evidence of electrical instability.      Impressions:   This study is normal.  There is no electrodiagnostic evidence of an entrapment neuropathy, generalized neuropathy, myopathy or significant cervical radiculopathy at this time.     Thank you for the consult.     Keith Bhatia MD    Nerve Conduction Studies  Anti Sensory Summary Table     Stim Site NR Peak (ms) Norm Peak (ms) P-T Amp (µV) Norm P-T Amp Site1 Site2 Dist (cm)   Left Median Anti Sensory (2nd Digit)  31.5 °C   Wrist    3.3 <4 32.0 >13 Wrist 2nd Digit 14.0   Right Median Anti Sensory (2nd Digit)  32.8 °C   Wrist    3.7 <4 28.8 >13 Wrist 2nd Digit 14.0   Elbow    3.7  21.3  Elbow Wrist 0.0   Left Radial Anti Sensory (Base 1st Digit)  32.4 °C   Wrist    2.0 <2.8 47.0 >11 Wrist Base 1st Digit 10.0   Right Radial Anti Sensory (Base 1st Digit)  32.7 °C   Wrist    1.9 <2.8 51.0 >11 Wrist Base 1st Digit 10.0   Left Ulnar Anti Sensory (5th Digit)  32.4 °C   Wrist    3.1 <4.0 36.8 >9 Wrist 5th Digit 14.0   Right Ulnar Anti Sensory (5th Digit)  32.7 °C   Wrist    2.9 <4.0 34.0 >9 Wrist 5th Digit 14.0     Motor Summary

## 2024-02-26 DIAGNOSIS — G62.9 POLYNEUROPATHY, UNSPECIFIED: ICD-10-CM

## 2024-02-27 RX ORDER — GABAPENTIN 400 MG/1
CAPSULE ORAL
Qty: 180 CAPSULE | Refills: 5 | Status: SHIPPED | OUTPATIENT
Start: 2024-02-27 | End: 2024-05-26

## 2024-02-28 DIAGNOSIS — G62.9 POLYNEUROPATHY, UNSPECIFIED: ICD-10-CM

## 2024-02-28 RX ORDER — NORTRIPTYLINE HYDROCHLORIDE 50 MG/1
50 CAPSULE ORAL NIGHTLY
Qty: 30 CAPSULE | Refills: 3 | Status: SHIPPED | OUTPATIENT
Start: 2024-02-28

## 2024-03-13 DIAGNOSIS — G62.9 POLYNEUROPATHY, UNSPECIFIED: ICD-10-CM

## 2024-03-13 RX ORDER — TOPIRAMATE 50 MG/1
50 TABLET, FILM COATED ORAL 2 TIMES DAILY
Qty: 180 TABLET | Refills: 1 | Status: SHIPPED | OUTPATIENT
Start: 2024-03-13

## 2024-04-24 ENCOUNTER — HOSPITAL ENCOUNTER (OUTPATIENT)
Facility: HOSPITAL | Age: 69
Discharge: HOME | End: 2024-04-27
Payer: MEDICARE

## 2024-04-24 DIAGNOSIS — K21.9 GASTROESOPHAGEAL REFLUX DISEASE WITHOUT ESOPHAGITIS: ICD-10-CM

## 2024-04-24 PROCEDURE — 74220 X-RAY XM ESOPHAGUS 1CNTRST: CPT

## 2024-04-25 RX ORDER — DULOXETIN HYDROCHLORIDE 30 MG/1
30 CAPSULE, DELAYED RELEASE ORAL DAILY
Qty: 90 CAPSULE | Refills: 0 | Status: SHIPPED | OUTPATIENT
Start: 2024-04-25

## 2024-07-11 ENCOUNTER — OFFICE VISIT (OUTPATIENT)
Age: 69
End: 2024-07-11
Payer: MEDICARE

## 2024-07-11 VITALS
RESPIRATION RATE: 20 BRPM | DIASTOLIC BLOOD PRESSURE: 80 MMHG | SYSTOLIC BLOOD PRESSURE: 130 MMHG | HEART RATE: 80 BPM | OXYGEN SATURATION: 97 %

## 2024-07-11 DIAGNOSIS — G62.9 POLYNEUROPATHY, UNSPECIFIED: ICD-10-CM

## 2024-07-11 DIAGNOSIS — G25.0 ESSENTIAL TREMOR: Primary | ICD-10-CM

## 2024-07-11 PROCEDURE — 1123F ACP DISCUSS/DSCN MKR DOCD: CPT

## 2024-07-11 PROCEDURE — G8427 DOCREV CUR MEDS BY ELIG CLIN: HCPCS

## 2024-07-11 PROCEDURE — 1090F PRES/ABSN URINE INCON ASSESS: CPT

## 2024-07-11 PROCEDURE — G8420 CALC BMI NORM PARAMETERS: HCPCS

## 2024-07-11 PROCEDURE — G8400 PT W/DXA NO RESULTS DOC: HCPCS

## 2024-07-11 PROCEDURE — 3017F COLORECTAL CA SCREEN DOC REV: CPT

## 2024-07-11 PROCEDURE — 1036F TOBACCO NON-USER: CPT

## 2024-07-11 PROCEDURE — 99214 OFFICE O/P EST MOD 30 MIN: CPT

## 2024-07-11 RX ORDER — TOPIRAMATE 50 MG/1
50 TABLET, FILM COATED ORAL 2 TIMES DAILY
Qty: 180 TABLET | Refills: 1 | Status: SHIPPED | OUTPATIENT
Start: 2024-07-11

## 2024-07-11 RX ORDER — GABAPENTIN 400 MG/1
CAPSULE ORAL
Qty: 540 CAPSULE | Refills: 2 | Status: SHIPPED | OUTPATIENT
Start: 2024-07-11 | End: 2024-10-11

## 2024-07-11 RX ORDER — DULOXETIN HYDROCHLORIDE 30 MG/1
30 CAPSULE, DELAYED RELEASE ORAL DAILY
Qty: 90 CAPSULE | Refills: 2 | Status: SHIPPED | OUTPATIENT
Start: 2024-07-11

## 2024-07-11 RX ORDER — ATORVASTATIN CALCIUM 10 MG/1
10 TABLET, FILM COATED ORAL DAILY
COMMUNITY
Start: 2024-06-03

## 2024-07-11 NOTE — PROGRESS NOTES
Polyneuropathy- has been doing fine, it is good  She is retired and that has helped with her not having to be on her feet all the time     She has the shakes in her hands, off and on for a while it has just gotten worse over time    Has noticed that her balance if off sometimes as well       
(TOPAMAX) 50 MG tablet     Sig: Take 1 tablet by mouth 2 times daily     Dispense:  180 tablet     Refill:  1        1. Essential tremor    2. Polyneuropathy, unspecified      Patient will continue to take Topamax 50 mg twice a day, gabapentin 400 mg 2 capsules 3 times a day, and nortriptyline 50 mg nightly for neuropathy pain.  Offered to increase the patient's Topamax a bit to see if it would help with the essential tremor however, she respectfully declined.  Patient may follow-up in 6 months or sooner if needed   Return in about 6 months (around 1/11/2025).

## 2024-07-26 ENCOUNTER — HOSPITAL ENCOUNTER (OUTPATIENT)
Facility: HOSPITAL | Age: 69
Discharge: HOME | End: 2024-07-29

## 2024-07-26 VITALS
HEART RATE: 75 BPM | RESPIRATION RATE: 16 BRPM | DIASTOLIC BLOOD PRESSURE: 59 MMHG | HEIGHT: 65 IN | SYSTOLIC BLOOD PRESSURE: 117 MMHG | OXYGEN SATURATION: 95 % | TEMPERATURE: 97.7 F | BODY MASS INDEX: 23.86 KG/M2 | WEIGHT: 143.2 LBS

## 2024-07-26 RX ORDER — LEVOTHYROXINE SODIUM 0.05 MG/1
50 TABLET ORAL EVERY MORNING
COMMUNITY

## 2024-07-26 RX ORDER — M-VIT,TX,IRON,MINS/CALC/FOLIC 27MG-0.4MG
1 TABLET ORAL EVERY MORNING
COMMUNITY

## 2024-07-26 RX ORDER — CELECOXIB 200 MG/1
200 CAPSULE ORAL 2 TIMES DAILY
COMMUNITY

## 2024-07-26 RX ORDER — DOCUSATE SODIUM 100 MG/1
200 CAPSULE, LIQUID FILLED ORAL 2 TIMES DAILY
COMMUNITY

## 2024-07-26 RX ORDER — VITAMIN B COMPLEX
1000 TABLET ORAL EVERY MORNING
COMMUNITY

## 2024-07-26 NOTE — DISCHARGE INSTRUCTIONS
[facility]                   [address] [place] [address]   PRE-ADMISSION TESTING    [phone]     Surgery Date:  8/9 Friday      Is surgery arrival time given by surgeon?  NO  If “NO”, [facility] staff will call you between 3 and 7pm the day before your surgery with your arrival time. (If your surgery is on a Monday, we will call you the Friday before.)    Call [phone] after 7pm Monday-Friday if you did not receive this call.    INSTRUCTIONS BEFORE YOUR SURGERY   When You  Arrive    Arrive at the 2nd Floor Admitting Desk on the day of your surgery     Have your insurance card, photo ID, and any copayment (if needed)   Food   and   Drink    NO food or drink after midnight the night before surgery       This means NO water, gum, mints, coffee, juice, etc.     No alcohol (beer, wine, liquor) 24 hours before and after surgery   Medications to TAKE Morning of Surgery      MEDICATIONS TO TAKE THE MORNING OF SURGERY WITH A SMALL SIP OF WATER:          Topiramate        Synthroid            Medications to STOP 7 Days Before Surgery Non-Steroidal anti-inflammatory Drugs (NSAID's): for example: Ibuprofen, Advil, Motrin, Naproxen, Aleve  Aspirin, if taking for pain  Herbal supplements, vitamins, and fish oil  Other: Celebrex, Vitamin D, and Multivitamin   (Pain medications not listed above, including Tylenol, may be taken)   Blood Thinners    If you take Aspirin, Plavix, Coumadin, or any blood-thinning or anti blood-clotting medicine, talk to the doctor who prescribed the medications for pre-operative instructions.    Bathing   Clothing  Jewelry  Valuables       If you shower the morning of surgery, please do not apply anything to your skin (lotions, powders, deodorant, or makeup, especially mascara)  Follow Chlorhexidine Care Fusion body wash instructions provided to you during PAT appointment. Begin 3 days prior to surgery.  Do not shave or trim anywhere 24 hours before

## 2024-08-02 NOTE — PROGRESS NOTES
Pt said she is still having some pains and cramps  Pt unsure if med is helping   Does have complaints of fatigue, said the tops of the feet hurt more [facility] [employer] paperwork with [employer] completed, signed faxed by PCP with confirmation to parent on 8/2/2024. Filed.

## 2024-08-09 ENCOUNTER — HOSPITAL ENCOUNTER (OUTPATIENT)
Facility: HOSPITAL | Age: 69
Setting detail: OUTPATIENT SURGERY
Discharge: HOME | End: 2024-08-09
Attending: SURGERY | Admitting: SURGERY
Payer: MEDICARE

## 2024-08-09 ENCOUNTER — ANESTHESIA (OUTPATIENT)
Facility: HOSPITAL | Age: 69
End: 2024-08-09
Payer: MEDICARE

## 2024-08-09 ENCOUNTER — ANESTHESIA EVENT (OUTPATIENT)
Facility: HOSPITAL | Age: 69
End: 2024-08-09
Payer: MEDICARE

## 2024-08-09 VITALS
DIASTOLIC BLOOD PRESSURE: 67 MMHG | TEMPERATURE: 98.4 F | SYSTOLIC BLOOD PRESSURE: 137 MMHG | OXYGEN SATURATION: 96 % | RESPIRATION RATE: 13 BRPM | HEART RATE: 62 BPM

## 2024-08-09 DIAGNOSIS — D17.20 LIPOMA OF UPPER EXTREMITY, UNSPECIFIED LATERALITY: Primary | ICD-10-CM

## 2024-08-09 PROCEDURE — 2720000010 HC SURG SUPPLY STERILE: Performed by: SURGERY

## 2024-08-09 PROCEDURE — 6360000002 HC RX W HCPCS: Performed by: SURGERY

## 2024-08-09 PROCEDURE — 3600000002 HC SURGERY LEVEL 2 BASE: Performed by: SURGERY

## 2024-08-09 PROCEDURE — 7100000000 HC PACU RECOVERY - FIRST 15 MIN: Performed by: SURGERY

## 2024-08-09 PROCEDURE — 3700000001 HC ADD 15 MINUTES (ANESTHESIA): Performed by: SURGERY

## 2024-08-09 PROCEDURE — 7100000001 HC PACU RECOVERY - ADDTL 15 MIN: Performed by: SURGERY

## 2024-08-09 PROCEDURE — 3700000000 HC ANESTHESIA ATTENDED CARE: Performed by: SURGERY

## 2024-08-09 PROCEDURE — 2580000003 HC RX 258: Performed by: NURSE ANESTHETIST, CERTIFIED REGISTERED

## 2024-08-09 PROCEDURE — 6360000002 HC RX W HCPCS: Performed by: NURSE ANESTHETIST, CERTIFIED REGISTERED

## 2024-08-09 PROCEDURE — 3600000012 HC SURGERY LEVEL 2 ADDTL 15MIN: Performed by: SURGERY

## 2024-08-09 PROCEDURE — 2580000003 HC RX 258: Performed by: SURGERY

## 2024-08-09 PROCEDURE — 88304 TISSUE EXAM BY PATHOLOGIST: CPT

## 2024-08-09 PROCEDURE — 2500000003 HC RX 250 WO HCPCS: Performed by: NURSE ANESTHETIST, CERTIFIED REGISTERED

## 2024-08-09 PROCEDURE — 2580000003 HC RX 258: Performed by: ANESTHESIOLOGY

## 2024-08-09 PROCEDURE — 2709999900 HC NON-CHARGEABLE SUPPLY: Performed by: SURGERY

## 2024-08-09 RX ORDER — SODIUM CHLORIDE, SODIUM LACTATE, POTASSIUM CHLORIDE, CALCIUM CHLORIDE 600; 310; 30; 20 MG/100ML; MG/100ML; MG/100ML; MG/100ML
INJECTION, SOLUTION INTRAVENOUS CONTINUOUS
Status: DISCONTINUED | OUTPATIENT
Start: 2024-08-09 | End: 2024-08-09 | Stop reason: HOSPADM

## 2024-08-09 RX ORDER — FENTANYL CITRATE 50 UG/ML
INJECTION, SOLUTION INTRAMUSCULAR; INTRAVENOUS PRN
Status: DISCONTINUED | OUTPATIENT
Start: 2024-08-09 | End: 2024-08-09 | Stop reason: SDUPTHER

## 2024-08-09 RX ORDER — BUPIVACAINE HYDROCHLORIDE 5 MG/ML
INJECTION, SOLUTION PERINEURAL PRN
Status: DISCONTINUED | OUTPATIENT
Start: 2024-08-09 | End: 2024-08-09 | Stop reason: ALTCHOICE

## 2024-08-09 RX ORDER — PROPOFOL 10 MG/ML
INJECTION, EMULSION INTRAVENOUS PRN
Status: DISCONTINUED | OUTPATIENT
Start: 2024-08-09 | End: 2024-08-09 | Stop reason: SDUPTHER

## 2024-08-09 RX ORDER — MIDAZOLAM HYDROCHLORIDE 1 MG/ML
INJECTION INTRAMUSCULAR; INTRAVENOUS PRN
Status: DISCONTINUED | OUTPATIENT
Start: 2024-08-09 | End: 2024-08-09 | Stop reason: SDUPTHER

## 2024-08-09 RX ORDER — DEXMEDETOMIDINE HYDROCHLORIDE 100 UG/ML
INJECTION, SOLUTION INTRAVENOUS PRN
Status: DISCONTINUED | OUTPATIENT
Start: 2024-08-09 | End: 2024-08-09 | Stop reason: SDUPTHER

## 2024-08-09 RX ORDER — NALOXONE HYDROCHLORIDE 0.4 MG/ML
INJECTION, SOLUTION INTRAMUSCULAR; INTRAVENOUS; SUBCUTANEOUS PRN
Status: DISCONTINUED | OUTPATIENT
Start: 2024-08-09 | End: 2024-08-09 | Stop reason: HOSPADM

## 2024-08-09 RX ORDER — PHENYLEPHRINE HCL IN 0.9% NACL 0.4MG/10ML
SYRINGE (ML) INTRAVENOUS PRN
Status: DISCONTINUED | OUTPATIENT
Start: 2024-08-09 | End: 2024-08-09 | Stop reason: SDUPTHER

## 2024-08-09 RX ORDER — MIDAZOLAM HYDROCHLORIDE 2 MG/2ML
2 INJECTION, SOLUTION INTRAMUSCULAR; INTRAVENOUS
Status: DISCONTINUED | OUTPATIENT
Start: 2024-08-09 | End: 2024-08-09 | Stop reason: HOSPADM

## 2024-08-09 RX ORDER — DIPHENHYDRAMINE HYDROCHLORIDE 50 MG/ML
12.5 INJECTION INTRAMUSCULAR; INTRAVENOUS
Status: DISCONTINUED | OUTPATIENT
Start: 2024-08-09 | End: 2024-08-09 | Stop reason: HOSPADM

## 2024-08-09 RX ORDER — LIDOCAINE HYDROCHLORIDE 10 MG/ML
1 INJECTION, SOLUTION EPIDURAL; INFILTRATION; INTRACAUDAL; PERINEURAL
Status: DISCONTINUED | OUTPATIENT
Start: 2024-08-09 | End: 2024-08-09 | Stop reason: HOSPADM

## 2024-08-09 RX ORDER — EPHEDRINE SULFATE/0.9% NACL/PF 50 MG/5 ML
SYRINGE (ML) INTRAVENOUS PRN
Status: DISCONTINUED | OUTPATIENT
Start: 2024-08-09 | End: 2024-08-09 | Stop reason: SDUPTHER

## 2024-08-09 RX ORDER — TRAMADOL HYDROCHLORIDE 50 MG/1
50 TABLET ORAL EVERY 6 HOURS PRN
Qty: 8 TABLET | Refills: 0 | Status: SHIPPED | OUTPATIENT
Start: 2024-08-09 | End: 2024-08-12

## 2024-08-09 RX ORDER — SODIUM CHLORIDE, SODIUM LACTATE, POTASSIUM CHLORIDE, CALCIUM CHLORIDE 600; 310; 30; 20 MG/100ML; MG/100ML; MG/100ML; MG/100ML
INJECTION, SOLUTION INTRAVENOUS CONTINUOUS PRN
Status: DISCONTINUED | OUTPATIENT
Start: 2024-08-09 | End: 2024-08-09 | Stop reason: SDUPTHER

## 2024-08-09 RX ORDER — FENTANYL CITRATE 50 UG/ML
100 INJECTION, SOLUTION INTRAMUSCULAR; INTRAVENOUS
Status: DISCONTINUED | OUTPATIENT
Start: 2024-08-09 | End: 2024-08-09 | Stop reason: HOSPADM

## 2024-08-09 RX ORDER — ONDANSETRON 2 MG/ML
4 INJECTION INTRAMUSCULAR; INTRAVENOUS
Status: DISCONTINUED | OUTPATIENT
Start: 2024-08-09 | End: 2024-08-09 | Stop reason: HOSPADM

## 2024-08-09 RX ADMIN — SODIUM CHLORIDE, POTASSIUM CHLORIDE, SODIUM LACTATE AND CALCIUM CHLORIDE: 600; 310; 30; 20 INJECTION, SOLUTION INTRAVENOUS at 10:59

## 2024-08-09 RX ADMIN — MIDAZOLAM HYDROCHLORIDE 2 MG: 1 INJECTION, SOLUTION INTRAMUSCULAR; INTRAVENOUS at 12:57

## 2024-08-09 RX ADMIN — SODIUM CHLORIDE, POTASSIUM CHLORIDE, SODIUM LACTATE AND CALCIUM CHLORIDE: 600; 310; 30; 20 INJECTION, SOLUTION INTRAVENOUS at 12:57

## 2024-08-09 RX ADMIN — FENTANYL CITRATE 25 MCG: 50 INJECTION, SOLUTION INTRAMUSCULAR; INTRAVENOUS at 13:12

## 2024-08-09 RX ADMIN — FENTANYL CITRATE 50 MCG: 50 INJECTION, SOLUTION INTRAMUSCULAR; INTRAVENOUS at 12:57

## 2024-08-09 RX ADMIN — Medication 10 MG: at 13:22

## 2024-08-09 RX ADMIN — WATER 2000 MG: 1 INJECTION INTRAMUSCULAR; INTRAVENOUS; SUBCUTANEOUS at 13:10

## 2024-08-09 RX ADMIN — FENTANYL CITRATE 25 MCG: 50 INJECTION, SOLUTION INTRAMUSCULAR; INTRAVENOUS at 13:10

## 2024-08-09 RX ADMIN — PROPOFOL 125 MCG/KG/MIN: 10 INJECTION, EMULSION INTRAVENOUS at 13:06

## 2024-08-09 RX ADMIN — DEXMEDETOMIDINE 6 MCG: 100 INJECTION, SOLUTION INTRAVENOUS at 12:57

## 2024-08-09 RX ADMIN — PROPOFOL 50 MG: 10 INJECTION, EMULSION INTRAVENOUS at 13:05

## 2024-08-09 RX ADMIN — Medication 80 MCG: at 13:25

## 2024-08-09 NOTE — DISCHARGE INSTRUCTIONS
GENERAL POST-OPERATIVE  PATIENT INSTRUCTIONS      FOLLOW-UP:  Call your physician immediately if you have any fevers greater than 102.5, drainage from you wound that is not clear or looks infected, persistent bleeding, increasing abdominal pain, problems urinating, or persistent nausea/vomiting.      WOUND CARE INSTRUCTIONS:  Keep a dry clean dressing on the wound if there is drainage. The initial bandage may be removed after 24 hours.  Once the wound has quit draining you may leave it open to air.  If clothing rubs against the wound or causes irritation and the wound is not draining you may cover it with a dry dressing during the daytime.  Try to keep the wound dry and avoid ointments on the wound unless directed to do so.  If the wound becomes bright red and painful or starts to drain infected material that is not clear, please contact your physician immediately.  If the wound is mildly pink and has a thick firm ridge underneath it, this is normal, and is referred to as a healing ridge.  This will resolve over the next 4-6 weeks.    DIET:  You may eat any foods that you can tolerate.  It is a good idea to eat a high fiber diet and take in plenty of fluids to prevent constipation.  If you do become constipated you may want to take a mild laxative or take ducolax tablets on a daily basis until your bowel habits are regular.  Constipation can be very uncomfortable, along with straining, after recent surgery.    ACTIVITY:  You are encouraged to cough and deep breath or use your incentive spirometer if you were given one, every 15-30 minutes when awake.  This will help prevent respiratory complications and low grade fevers post-operatively if you had a general anesthetic.  You may want to hug a pillow when coughing and sneezing to add additional support to the surgical area, if you had abdominal or chest surgery, which will decrease pain during these times.  You are encouraged to walk and engage in light activity for

## 2024-08-09 NOTE — DISCHARGE SUMMARY
Discharge Summary    Patient: Veronica Pearson               Sex: female          DOA: 8/9/2024  9:29 AM       YOB: 1955      Age:  69 y.o.        LOS:  LOS: 0 days                Discharge Date:      Admission Diagnoses: Lipoma of right shoulder [D17.21]    Discharge Diagnoses:  Same    Procedure:  Procedure(s):  EXCISION OF RIGHT SHOULDER LIPOMA (MAC WITH LOCAL)    Discharge Condition: Good    Hospital Course: Unremarkable operative procedure.  Discharge to home in stable condition.      Consults: None    Significant Diagnostic Studies: See full electronic record.     Discharge Medications:     Current Discharge Medication List        START taking these medications    Details   traMADol (ULTRAM) 50 MG tablet Take 1 tablet by mouth every 6 hours as needed for Pain for up to 3 days. Intended supply: 3 days. Take lowest dose possible to manage pain Max Daily Amount: 200 mg  Qty: 8 tablet, Refills: 0    Comments: Reduce doses taken as pain becomes manageable  Associated Diagnoses: Lipoma of upper extremity, unspecified laterality           CONTINUE these medications which have NOT CHANGED    Details   celecoxib (CELEBREX) 200 MG capsule Take 1 capsule by mouth 2 times daily      levothyroxine (SYNTHROID) 50 MCG tablet Take 1 tablet by mouth every morning      docusate sodium (COLACE) 100 MG capsule Take 2 capsules by mouth 2 times daily      Multiple Vitamins-Minerals (THERAPEUTIC MULTIVITAMIN-MINERALS) tablet Take 1 tablet by mouth every morning      Vitamin D (CHOLECALCIFEROL) 25 MCG (1000 UT) TABS tablet Take 1 tablet by mouth every morning      atorvastatin (LIPITOR) 10 MG tablet Take 1 tablet by mouth every evening      gabapentin (NEURONTIN) 400 MG capsule Take 2 tabs three times a day  Qty: 540 capsule, Refills: 2    Associated Diagnoses: Polyneuropathy, unspecified      DULoxetine (CYMBALTA) 30 MG extended release capsule Take 1 capsule by mouth daily  Qty: 90 capsule, Refills: 2    Associated

## 2024-08-09 NOTE — BRIEF OP NOTE
Brief Postoperative Note      Patient: Veronica Pearson  YOB: 1955  MRN: [id number]    Date of Procedure: 8/9/2024    Pre-Op Diagnosis Codes:     * Lipoma of right shoulder [D17.21]    Post-Op Diagnosis: Same       Procedure(s):  EXCISION OF RIGHT SHOULDER LIPOMA (MAC WITH LOCAL)    Surgeon(s):  Mehran Quiñones MD    Assistant:  Surgical Assistant: Sunshine Orellana    Anesthesia: Monitor Anesthesia Care    Estimated Blood Loss (mL): Minimal    Complications: None    Specimens:   ID Type Source Tests Collected by Time Destination   1 : RT shoulder lipoma Tissue Joint, Shoulder SURGICAL PATHOLOGY Mehran Quiñones MD 8/9/2024 1331        Implants:  * No implants in log *      Drains: * No LDAs found *    Findings:  Infection Present At Time Of Surgery (PATOS) (choose all levels that have infection present):  No infection present  Other Findings: 8 cm lipoma    Electronically signed by Mehran Quiñones MD on 8/9/2024 at 1:41 PM

## 2024-08-09 NOTE — ANESTHESIA POSTPROCEDURE EVALUATION
Department of Anesthesiology  Postprocedure Note    Patient: Veronica Pearson  MRN: [id number]  YOB: 1955  Date of evaluation: 8/9/2024    Procedure Summary       Date: 08/09/24 Room / Location: [facility] MAIN OR  / [facility] MAIN OR    Anesthesia Start: 1257 Anesthesia Stop: 1356    Procedure: EXCISION OF RIGHT SHOULDER LIPOMA (MAC WITH LOCAL) (Right: Shoulder) Diagnosis:       Lipoma of right shoulder      (Lipoma of right shoulder [D17.21])    Surgeons: Mehran Quiñones MD Responsible Provider: Carlos Woo DO    Anesthesia Type: MAC ASA Status: 2            Anesthesia Type: No value filed.    Lindsey Phase I: Lindsey Score: 10    Lindsey Phase II:      Anesthesia Post Evaluation    Patient location during evaluation: PACU  Patient participation: complete - patient participated  Level of consciousness: awake and alert  Airway patency: patent  Nausea & Vomiting: no vomiting and no nausea  Cardiovascular status: hemodynamically stable  Respiratory status: acceptable  Hydration status: stable  Comments: Patient seen and examined.  Ready for discharge from PACU.    Multimodal analgesia pain management approach  Pain management: adequate    No notable events documented.

## 2024-08-09 NOTE — ANESTHESIA PRE PROCEDURE
Department of Anesthesiology  Preprocedure Note       Name:  Veronica Pearson   Age:  69 y.o.  :  1955                                          MRN:  [id number]         Date:  2024      Surgeon: Surgeon(s):  Mehran Quiñones MD    Procedure: Procedure(s):  EXCISION OF RIGHT SHOULDER LIPOMA (MAC WITH LOCAL)    Medications prior to admission:   Prior to Admission medications    Medication Sig Start Date End Date Taking? Authorizing Provider   traMADol (ULTRAM) 50 MG tablet Take 1 tablet by mouth every 6 hours as needed for Pain for up to 3 days. Intended supply: 3 days. Take lowest dose possible to manage pain Max Daily Amount: 200 mg 24 Yes Mehran Quiñones MD   celecoxib (CELEBREX) 200 MG capsule Take 1 capsule by mouth 2 times daily    Cara Medina MD   levothyroxine (SYNTHROID) 50 MCG tablet Take 1 tablet by mouth every morning    Cara Medina MD   docusate sodium (COLACE) 100 MG capsule Take 2 capsules by mouth 2 times daily    Cara Medina MD   Multiple Vitamins-Minerals (THERAPEUTIC MULTIVITAMIN-MINERALS) tablet Take 1 tablet by mouth every morning    Cara Medina MD   Vitamin D (CHOLECALCIFEROL) 25 MCG (1000 UT) TABS tablet Take 1 tablet by mouth every morning    Cara Medina MD   atorvastatin (LIPITOR) 10 MG tablet Take 1 tablet by mouth every evening 6/3/24   Cara eMdina MD   gabapentin (NEURONTIN) 400 MG capsule Take 2 tabs three times a day 7/11/24 10/11/24  Marivel Stone APRN - NP   DULoxetine (CYMBALTA) 30 MG extended release capsule Take 1 capsule by mouth daily 24   Marivel Stone APRN - NP   topiramate (TOPAMAX) 50 MG tablet Take 1 tablet by mouth 2 times daily 24   Marivel Stone APRN - NP   nortriptyline (PAMELOR) 50 MG capsule TAKE 1 CAPSULE BY MOUTH NIGHTLY 24   Marivel Stone APRN - NP   estradiol (VIVELLE) 0.1 MG/24HR APPLY 1 PATCH TOPICALLY TO SKIN TWICE A WEEK 22   Automatic Reconciliation, Ar

## 2024-08-09 NOTE — OP NOTE
Operative Note      Patient: Veronica Pearson  YOB: 1955  MRN: [id number]    Date of Procedure: 8/9/2024    Pre-Op Diagnosis Codes:     * Lipoma of right shoulder [D17.21]    Post-Op Diagnosis: Same       Procedure(s):  EXCISION OF RIGHT SHOULDER LIPOMA (MAC WITH LOCAL)    Surgeon(s):  Mehran Quiñones MD    Assistant:  Surgical Assistant: Sunshine Orellana    Anesthesia: Monitor Anesthesia Care    Estimated Blood Loss (mL): Minimal    Complications: None    Specimens:   ID Type Source Tests Collected by Time Destination   1 : RT shoulder lipoma Tissue Joint, Shoulder SURGICAL PATHOLOGY Mehran Quiñones MD 8/9/2024 1331        Implants:  * No implants in log *      Drains: * No LDAs found *    Findings:  Infection Present At Time Of Surgery (PATOS) (choose all levels that have infection present):  No infection present  Other Findings: 8 cm lipoma    Electronically signed by Mehran Quiñones MD on 8/9/2024 at 1:41 PM    Indication: See paper history and physical.      Description: The patient was brought to the operating room and underwent successful monitored anesthesia and airway control.   All appropriate monitoring devices were placed. The patient was placed in left lateral decubitus position.   All pressure points were padded and then the patient was prepped and draped in the usual sterile fashion. A time out was completed verifying patient, procedure, site, positioning and any needed special equipment prior to beginning this procedure.  Pre-operative antibiotics were administered within 30 minutes of skin incision.      A 5 cm skin incision was made with a knife. This was deepened sharply through subcutaneous tissues. The incision was continued down around the fatty mass.  Specimen was passed off for pathologic analysis.  Hemostasis was achieved with electrocautery.   Wound was irrigated with warmed saline.  The adjacent subcutaneous dead space was gently apposed to completely close the dead space

## 2024-08-09 NOTE — H&P
Assessment:     Lipoma    Plan:     Excision    Signed By: Mehran Quiñones MD  [facility]  Office:  [phone]  Fax:  [phone]             General Surgery History and Physical    Subjective:      Veronica Pearson is a 69 y.o.  female who presents with above.     Past Medical History:   Diagnosis Date    Arthritis     Classical migraine     GERD (gastroesophageal reflux disease)     Glaucoma     High blood cholesterol     Hypothyroidism     Insomnia     Neuropathy      Past Surgical History:   Procedure Laterality Date    BLADDER SUSPENSION      COLONOSCOPY      x3    HYSTERECTOMY (CERVIX STATUS UNKNOWN)      LIPOMA RESECTION      back    UPPER GASTROINTESTINAL ENDOSCOPY        History reviewed. No pertinent family history.  Social History     Socioeconomic History    Marital status:      Spouse name: None    Number of children: None    Years of education: None    Highest education level: None   Tobacco Use    Smoking status: Never    Smokeless tobacco: Never   Vaping Use    Vaping Use: Never used   Substance and Sexual Activity    Alcohol use: Not Currently    Drug use: Never      Current Facility-Administered Medications   Medication Dose Route Frequency    lidocaine PF 1 % injection 1 mL  1 mL IntraDERmal Once PRN    fentaNYL (SUBLIMAZE) injection 100 mcg  100 mcg IntraVENous Once PRN    lactated ringers IV soln infusion   IntraVENous Continuous    midazolam PF (VERSED) injection 2 mg  2 mg IntraVENous Once PRN    ceFAZolin (ANCEF) 2,000 mg in sterile water 20 mL IV syringe  2,000 mg IntraVENous Once      No Known Allergies    Review of Systems:     []     Unable to obtain  ROS due to  []    mental status change  []    sedated   []    intubated   [x]    Total of 12 system negative, unless specified below or in HPI:  Constitutional: negative fever, negative chills, negative weight loss  Eyes:   negative visual changes  ENT:   negative sore throat, tongue or lip swelling  Respiratory:

## 2025-01-08 NOTE — PROGRESS NOTES
Veronica Pearson is a 69 y.o. female who presents with the following  Chief Complaint   Patient presents with    Follow-up     Polyneuropathy      Last office visit note  HPI  Patient is here today for neuropathy, Tremor and test results follow-up.  Since the patient was here last she did get the EMG of the upper extremities for tips of her fingers were numb and pain at her wrists and forearms, dropping things.  EMG of the upper extremities was normal.  She continues to take Topamax 50 mg twice a day, gabapentin 400 mg 2 tabs 3 times a day, and nortriptyline 50 mg nightly for her neuropathy in her lower extremities.  She does have some needlelike pain in her feet but not that often.  Neuropathy is stable.  Today the patient says that the tremor that she has has gotten worse over time in her bilateral hands and she also feels it inside her body as well.  She says that she tremor is more when she is holding things.  Family history of essential tremor.  She says that it does not interfere with her everyday activities and is not interested in medication at this time.    Patient will continue to take Topamax 50 mg twice a day, gabapentin 400 mg 2 capsules 3 times a day, and nortriptyline 50 mg nightly for neuropathy pain.  Offered to increase the patient's Topamax a bit to see if it would help with the essential tremor however, she respectfully declined.  Patient may follow-up in 6 months or sooner if needed       Today's office visit note  HPI  Patient is here today for neuropathy and essential tremor follow-up.  Since the patient was here last she continues to take gabapentin 400 mg 2 tablets 3 times a day, and nortriptyline 50 mg daily for neuropathy of the lower extremities.  She says that she can tell if she misses a dose of gabapentin as she will start to have tingling and pain in her feet.  She tells me that she did stop taking duloxetine 1 month ago because she did not feel like it was making any difference.  She

## 2025-01-09 ENCOUNTER — OFFICE VISIT (OUTPATIENT)
Age: 70
End: 2025-01-09
Payer: MEDICARE

## 2025-01-09 VITALS
SYSTOLIC BLOOD PRESSURE: 122 MMHG | RESPIRATION RATE: 20 BRPM | OXYGEN SATURATION: 98 % | HEART RATE: 76 BPM | DIASTOLIC BLOOD PRESSURE: 64 MMHG

## 2025-01-09 DIAGNOSIS — G25.0 ESSENTIAL TREMOR: Primary | ICD-10-CM

## 2025-01-09 DIAGNOSIS — G62.9 POLYNEUROPATHY, UNSPECIFIED: ICD-10-CM

## 2025-01-09 PROCEDURE — 3017F COLORECTAL CA SCREEN DOC REV: CPT

## 2025-01-09 PROCEDURE — 1036F TOBACCO NON-USER: CPT

## 2025-01-09 PROCEDURE — 99214 OFFICE O/P EST MOD 30 MIN: CPT

## 2025-01-09 PROCEDURE — G8427 DOCREV CUR MEDS BY ELIG CLIN: HCPCS

## 2025-01-09 PROCEDURE — 1126F AMNT PAIN NOTED NONE PRSNT: CPT

## 2025-01-09 PROCEDURE — 1160F RVW MEDS BY RX/DR IN RCRD: CPT

## 2025-01-09 PROCEDURE — G8400 PT W/DXA NO RESULTS DOC: HCPCS

## 2025-01-09 PROCEDURE — 1159F MED LIST DOCD IN RCRD: CPT

## 2025-01-09 PROCEDURE — G8420 CALC BMI NORM PARAMETERS: HCPCS

## 2025-01-09 PROCEDURE — 1090F PRES/ABSN URINE INCON ASSESS: CPT

## 2025-01-09 PROCEDURE — 1123F ACP DISCUSS/DSCN MKR DOCD: CPT

## 2025-01-09 RX ORDER — TOPIRAMATE 50 MG/1
TABLET, FILM COATED ORAL
Qty: 270 TABLET | Refills: 3 | Status: SHIPPED | OUTPATIENT
Start: 2025-01-09

## 2025-01-09 RX ORDER — NORTRIPTYLINE HYDROCHLORIDE 50 MG/1
50 CAPSULE ORAL NIGHTLY
Qty: 90 CAPSULE | Refills: 3 | Status: SHIPPED | OUTPATIENT
Start: 2025-01-09

## 2025-01-09 RX ORDER — GABAPENTIN 400 MG/1
CAPSULE ORAL
Qty: 540 CAPSULE | Refills: 2 | Status: SHIPPED | OUTPATIENT
Start: 2025-01-09 | End: 2025-04-11

## 2025-02-14 ENCOUNTER — TRANSCRIBE ORDERS (OUTPATIENT)
Facility: HOSPITAL | Age: 70
End: 2025-02-14

## 2025-02-14 ENCOUNTER — HOSPITAL ENCOUNTER (OUTPATIENT)
Facility: HOSPITAL | Age: 70
Discharge: HOME | End: 2025-02-17
Payer: MEDICARE

## 2025-02-14 DIAGNOSIS — K21.9 GASTROESOPHAGEAL REFLUX DISEASE WITHOUT ESOPHAGITIS: Primary | ICD-10-CM

## 2025-02-14 DIAGNOSIS — R14.0 BLOATING: ICD-10-CM

## 2025-02-14 DIAGNOSIS — R19.4 BOWEL HABIT CHANGES: ICD-10-CM

## 2025-02-14 DIAGNOSIS — K21.9 GASTROESOPHAGEAL REFLUX DISEASE WITHOUT ESOPHAGITIS: ICD-10-CM

## 2025-02-14 PROCEDURE — 74018 RADEX ABDOMEN 1 VIEW: CPT

## 2025-03-28 ENCOUNTER — HOSPITAL ENCOUNTER (EMERGENCY)
Facility: HOSPITAL | Age: 70
Discharge: HOME | End: 2025-03-28
Attending: EMERGENCY MEDICINE
Payer: MEDICARE

## 2025-03-28 ENCOUNTER — APPOINTMENT (OUTPATIENT)
Facility: HOSPITAL | Age: 70
End: 2025-03-28
Payer: MEDICARE

## 2025-03-28 VITALS
BODY MASS INDEX: 22.96 KG/M2 | TEMPERATURE: 97.7 F | SYSTOLIC BLOOD PRESSURE: 146 MMHG | WEIGHT: 134.48 LBS | HEART RATE: 76 BPM | OXYGEN SATURATION: 98 % | HEIGHT: 64 IN | RESPIRATION RATE: 16 BRPM | DIASTOLIC BLOOD PRESSURE: 72 MMHG

## 2025-03-28 DIAGNOSIS — R11.2 NAUSEA AND VOMITING, UNSPECIFIED VOMITING TYPE: ICD-10-CM

## 2025-03-28 DIAGNOSIS — E86.0 DEHYDRATION: Primary | ICD-10-CM

## 2025-03-28 LAB
ALBUMIN SERPL-MCNC: 4.2 G/DL (ref 3.5–5)
ALBUMIN/GLOB SERPL: 1.2 (ref 1.1–2.2)
ALP SERPL-CCNC: 73 U/L (ref 45–117)
ALT SERPL-CCNC: 21 U/L (ref 12–78)
ANION GAP SERPL CALC-SCNC: 12 MMOL/L (ref 2–12)
AST SERPL-CCNC: 14 U/L (ref 15–37)
BASOPHILS # BLD: 0.03 K/UL (ref 0–0.1)
BASOPHILS NFR BLD: 0.3 % (ref 0–1)
BILIRUB SERPL-MCNC: 0.6 MG/DL (ref 0.2–1)
BUN SERPL-MCNC: 25 MG/DL (ref 6–20)
BUN/CREAT SERPL: 26 (ref 12–20)
CALCIUM SERPL-MCNC: 9.2 MG/DL (ref 8.5–10.1)
CHLORIDE SERPL-SCNC: 103 MMOL/L (ref 97–108)
CO2 SERPL-SCNC: 23 MMOL/L (ref 21–32)
CREAT SERPL-MCNC: 0.95 MG/DL (ref 0.55–1.02)
DIFFERENTIAL METHOD BLD: ABNORMAL
EOSINOPHIL # BLD: 0.05 K/UL (ref 0–0.4)
EOSINOPHIL NFR BLD: 0.5 % (ref 0–7)
ERYTHROCYTE [DISTWIDTH] IN BLOOD BY AUTOMATED COUNT: 12.6 % (ref 11.5–14.5)
FLUAV RNA SPEC QL NAA+PROBE: NOT DETECTED
FLUBV RNA SPEC QL NAA+PROBE: NOT DETECTED
GLOBULIN SER CALC-MCNC: 3.6 G/DL (ref 2–4)
GLUCOSE BLD STRIP.AUTO-MCNC: 115 MG/DL (ref 65–117)
GLUCOSE SERPL-MCNC: 117 MG/DL (ref 65–100)
HCT VFR BLD AUTO: 44.2 % (ref 35–47)
HGB BLD-MCNC: 15.8 G/DL (ref 11.5–16)
IMM GRANULOCYTES # BLD AUTO: 0.03 K/UL (ref 0–0.04)
IMM GRANULOCYTES NFR BLD AUTO: 0.3 % (ref 0–0.5)
LIPASE SERPL-CCNC: 29 U/L (ref 13–75)
LYMPHOCYTES # BLD: 1.18 K/UL (ref 0.8–3.5)
LYMPHOCYTES NFR BLD: 10.8 % (ref 12–49)
MAGNESIUM SERPL-MCNC: 2.2 MG/DL (ref 1.6–2.4)
MCH RBC QN AUTO: 32.6 PG (ref 26–34)
MCHC RBC AUTO-ENTMCNC: 35.7 G/DL (ref 30–36.5)
MCV RBC AUTO: 91.3 FL (ref 80–99)
MONOCYTES # BLD: 0.67 K/UL (ref 0–1)
MONOCYTES NFR BLD: 6.2 % (ref 5–13)
NEUTS SEG # BLD: 8.93 K/UL (ref 1.8–8)
NEUTS SEG NFR BLD: 81.9 % (ref 32–75)
NRBC # BLD: 0 K/UL (ref 0–0.01)
NRBC BLD-RTO: 0 PER 100 WBC
PLATELET # BLD AUTO: 261 K/UL (ref 150–400)
PMV BLD AUTO: 9.8 FL (ref 8.9–12.9)
POTASSIUM SERPL-SCNC: 3.3 MMOL/L (ref 3.5–5.1)
PROT SERPL-MCNC: 7.8 G/DL (ref 6.4–8.2)
RBC # BLD AUTO: 4.84 M/UL (ref 3.8–5.2)
SARS-COV-2 RNA RESP QL NAA+PROBE: NOT DETECTED
SERVICE CMNT-IMP: NORMAL
SODIUM SERPL-SCNC: 138 MMOL/L (ref 136–145)
SOURCE: NORMAL
WBC # BLD AUTO: 10.9 K/UL (ref 3.6–11)

## 2025-03-28 PROCEDURE — 96375 TX/PRO/DX INJ NEW DRUG ADDON: CPT

## 2025-03-28 PROCEDURE — 93005 ELECTROCARDIOGRAM TRACING: CPT | Performed by: EMERGENCY MEDICINE

## 2025-03-28 PROCEDURE — 2580000003 HC RX 258: Performed by: EMERGENCY MEDICINE

## 2025-03-28 PROCEDURE — 36415 COLL VENOUS BLD VENIPUNCTURE: CPT

## 2025-03-28 PROCEDURE — 80053 COMPREHEN METABOLIC PANEL: CPT

## 2025-03-28 PROCEDURE — 83735 ASSAY OF MAGNESIUM: CPT

## 2025-03-28 PROCEDURE — 87636 SARSCOV2 & INF A&B AMP PRB: CPT

## 2025-03-28 PROCEDURE — 96361 HYDRATE IV INFUSION ADD-ON: CPT

## 2025-03-28 PROCEDURE — 99285 EMERGENCY DEPT VISIT HI MDM: CPT

## 2025-03-28 PROCEDURE — 74018 RADEX ABDOMEN 1 VIEW: CPT

## 2025-03-28 PROCEDURE — 82962 GLUCOSE BLOOD TEST: CPT

## 2025-03-28 PROCEDURE — 85025 COMPLETE CBC W/AUTO DIFF WBC: CPT

## 2025-03-28 PROCEDURE — 83690 ASSAY OF LIPASE: CPT

## 2025-03-28 PROCEDURE — 96374 THER/PROPH/DIAG INJ IV PUSH: CPT

## 2025-03-28 PROCEDURE — 6360000002 HC RX W HCPCS: Performed by: EMERGENCY MEDICINE

## 2025-03-28 RX ORDER — DROPERIDOL 2.5 MG/ML
0.62 INJECTION, SOLUTION INTRAMUSCULAR; INTRAVENOUS ONCE
Status: COMPLETED | OUTPATIENT
Start: 2025-03-28 | End: 2025-03-28

## 2025-03-28 RX ORDER — 0.9 % SODIUM CHLORIDE 0.9 %
1000 INTRAVENOUS SOLUTION INTRAVENOUS ONCE
Status: COMPLETED | OUTPATIENT
Start: 2025-03-28 | End: 2025-03-28

## 2025-03-28 RX ORDER — KETOROLAC TROMETHAMINE 30 MG/ML
15 INJECTION, SOLUTION INTRAMUSCULAR; INTRAVENOUS ONCE
Status: COMPLETED | OUTPATIENT
Start: 2025-03-28 | End: 2025-03-28

## 2025-03-28 RX ORDER — ONDANSETRON 2 MG/ML
4 INJECTION INTRAMUSCULAR; INTRAVENOUS EVERY 6 HOURS PRN
Status: DISCONTINUED | OUTPATIENT
Start: 2025-03-28 | End: 2025-03-29 | Stop reason: HOSPADM

## 2025-03-28 RX ADMIN — SODIUM CHLORIDE 1000 ML: 0.9 INJECTION, SOLUTION INTRAVENOUS at 22:24

## 2025-03-28 RX ADMIN — DROPERIDOL 0.62 MG: 2.5 INJECTION, SOLUTION INTRAMUSCULAR; INTRAVENOUS at 22:55

## 2025-03-28 RX ADMIN — KETOROLAC TROMETHAMINE 15 MG: 30 INJECTION, SOLUTION INTRAMUSCULAR at 22:25

## 2025-03-28 RX ADMIN — ONDANSETRON 4 MG: 2 INJECTION, SOLUTION INTRAMUSCULAR; INTRAVENOUS at 22:24

## 2025-03-28 ASSESSMENT — PAIN DESCRIPTION - DESCRIPTORS
DESCRIPTORS: ACHING
DESCRIPTORS: ACHING

## 2025-03-28 ASSESSMENT — PAIN - FUNCTIONAL ASSESSMENT
PAIN_FUNCTIONAL_ASSESSMENT: PREVENTS OR INTERFERES SOME ACTIVE ACTIVITIES AND ADLS
PAIN_FUNCTIONAL_ASSESSMENT: 0-10
PAIN_FUNCTIONAL_ASSESSMENT: 0-10

## 2025-03-28 ASSESSMENT — PAIN DESCRIPTION - ORIENTATION: ORIENTATION: MID

## 2025-03-28 ASSESSMENT — PAIN SCALES - GENERAL
PAINLEVEL_OUTOF10: 8
PAINLEVEL_OUTOF10: 5

## 2025-03-28 ASSESSMENT — PAIN DESCRIPTION - PAIN TYPE
TYPE: ACUTE PAIN
TYPE: ACUTE PAIN

## 2025-03-28 ASSESSMENT — PAIN DESCRIPTION - LOCATION
LOCATION: HEAD
LOCATION: ABDOMEN

## 2025-03-29 NOTE — ED TRIAGE NOTES
Patient ambulatory to triage with steady gait reports vomiting and diarrhea since Tuesday night with mid abdominal pain. Reports headache and taking a headache pill.

## 2025-03-29 NOTE — ED PROVIDER NOTES
[place] DEPARTMENT  EMERGENCY DEPARTMENT ENCOUNTER      Patient Name: Veronica Pearson  MRN: [id number]  Birthdate 1955  Date of Evaluation: 3/28/2025  Physician: Kev Carreno MD    CHIEF COMPLAINT       Chief Complaint   Patient presents with    Illness       HISTORY OF PRESENT ILLNESS   (Location/Symptom, Timing/Onset, Context/Setting, Quality, Duration, Modifying Factors, Severity)   Veronica Pearson, 69 y.o., female     69 female presents with nausea vomiting abdominal pain.  Patient states that symptoms been ongoing for the last 2 days.  She started Ozempic last week and increased her dose from 1 to 2 units several days ago.          Nursing Notes were reviewed.    REVIEW OF SYSTEMS    (Not required)   Review of Systems    Except as noted above the remainder of the review of systems was reviewed and negative.     PAST MEDICAL HISTORY     Past Medical History:   Diagnosis Date    Arthritis     Classical migraine     GERD (gastroesophageal reflux disease)     Glaucoma     High blood cholesterol     Hypothyroidism     Insomnia     Neuropathy        SURGICAL HISTORY       Past Surgical History:   Procedure Laterality Date    ARM SURGERY Right 8/9/2024    EXCISION OF RIGHT SHOULDER LIPOMA (MAC WITH LOCAL) performed by Mehran Quiñones MD at [facility] MAIN OR    BLADDER SUSPENSION      COLONOSCOPY      x3    HYSTERECTOMY (CERVIX STATUS UNKNOWN)      LIPOMA RESECTION      back    UPPER GASTROINTESTINAL ENDOSCOPY         CURRENT MEDICATIONS       Previous Medications    ATORVASTATIN (LIPITOR) 10 MG TABLET    Take 1 tablet by mouth every evening    CELECOXIB (CELEBREX) 200 MG CAPSULE    Take 1 capsule by mouth 2 times daily    DOCUSATE SODIUM (COLACE) 100 MG CAPSULE    Take 2 capsules by mouth 2 times daily    ESTRADIOL (VIVELLE) 0.1 MG/24HR    APPLY 1 PATCH TOPICALLY TO SKIN TWICE A WEEK    GABAPENTIN (NEURONTIN) 400 MG CAPSULE    Take 2 tabs three times a day    LATANOPROST (XALATAN) 0.005 % OPHTHALMIC

## 2025-03-30 LAB
EKG ATRIAL RATE: 75 BPM
EKG DIAGNOSIS: NORMAL
EKG P AXIS: 23 DEGREES
EKG P-R INTERVAL: 160 MS
EKG Q-T INTERVAL: 404 MS
EKG QRS DURATION: 70 MS
EKG QTC CALCULATION (BAZETT): 451 MS
EKG R AXIS: 15 DEGREES
EKG T AXIS: 51 DEGREES
EKG VENTRICULAR RATE: 75 BPM

## 2025-03-30 PROCEDURE — 93010 ELECTROCARDIOGRAM REPORT: CPT | Performed by: INTERNAL MEDICINE

## 2025-04-22 ENCOUNTER — TRANSCRIBE ORDERS (OUTPATIENT)
Facility: HOSPITAL | Age: 70
End: 2025-04-22

## 2025-04-22 DIAGNOSIS — R43.2 ALTERED TASTE: ICD-10-CM

## 2025-04-22 DIAGNOSIS — R14.0 BLOATING: ICD-10-CM

## 2025-04-22 DIAGNOSIS — R10.30 LOWER ABDOMINAL PAIN, UNSPECIFIED: ICD-10-CM

## 2025-04-22 DIAGNOSIS — K59.00 CONSTIPATION, UNSPECIFIED CONSTIPATION TYPE: Primary | ICD-10-CM

## 2025-05-09 ENCOUNTER — HOSPITAL ENCOUNTER (OUTPATIENT)
Facility: HOSPITAL | Age: 70
Discharge: HOME | End: 2025-05-12
Payer: MEDICARE

## 2025-05-09 DIAGNOSIS — R14.0 BLOATING: ICD-10-CM

## 2025-05-09 DIAGNOSIS — R10.30 LOWER ABDOMINAL PAIN, UNSPECIFIED: ICD-10-CM

## 2025-05-09 DIAGNOSIS — R43.2 ALTERED TASTE: ICD-10-CM

## 2025-05-09 DIAGNOSIS — K59.00 CONSTIPATION, UNSPECIFIED CONSTIPATION TYPE: ICD-10-CM

## 2025-05-09 PROCEDURE — 6360000004 HC RX CONTRAST MEDICATION: Performed by: PHYSICIAN ASSISTANT

## 2025-05-09 PROCEDURE — 74177 CT ABD & PELVIS W/CONTRAST: CPT

## 2025-05-09 RX ORDER — IOPAMIDOL 755 MG/ML
INJECTION, SOLUTION INTRAVASCULAR
Status: CANCELLED | OUTPATIENT
Start: 2025-05-09

## 2025-05-09 RX ORDER — IOPAMIDOL 755 MG/ML
100 INJECTION, SOLUTION INTRAVASCULAR
Status: COMPLETED | OUTPATIENT
Start: 2025-05-09 | End: 2025-05-09

## 2025-05-09 RX ADMIN — IOPAMIDOL 100 ML: 755 INJECTION, SOLUTION INTRAVENOUS at 15:25

## 2025-05-14 ENCOUNTER — TELEPHONE (OUTPATIENT)
Age: 70
End: 2025-05-14

## 2025-05-14 NOTE — TELEPHONE ENCOUNTER
Patient is requesting a refill on her medication Gabapentin. States she has about a weeks worth left.    Please advise.

## 2025-05-15 DIAGNOSIS — G62.9 POLYNEUROPATHY, UNSPECIFIED: ICD-10-CM

## 2025-05-15 RX ORDER — GABAPENTIN 400 MG/1
CAPSULE ORAL
Qty: 540 CAPSULE | Refills: 2 | Status: SHIPPED | OUTPATIENT
Start: 2025-05-15 | End: 2025-08-15

## 2025-08-14 ENCOUNTER — TELEPHONE (OUTPATIENT)
Age: 70
End: 2025-08-14

## 2025-08-14 ENCOUNTER — OFFICE VISIT (OUTPATIENT)
Age: 70
End: 2025-08-14
Payer: MEDICARE

## 2025-08-14 VITALS
DIASTOLIC BLOOD PRESSURE: 78 MMHG | WEIGHT: 134 LBS | OXYGEN SATURATION: 97 % | HEIGHT: 64 IN | BODY MASS INDEX: 22.88 KG/M2 | RESPIRATION RATE: 18 BRPM | SYSTOLIC BLOOD PRESSURE: 122 MMHG | HEART RATE: 73 BPM

## 2025-08-14 DIAGNOSIS — G62.9 POLYNEUROPATHY, UNSPECIFIED: Primary | ICD-10-CM

## 2025-08-14 DIAGNOSIS — G25.0 ESSENTIAL TREMOR: ICD-10-CM

## 2025-08-14 PROCEDURE — G8420 CALC BMI NORM PARAMETERS: HCPCS

## 2025-08-14 PROCEDURE — 3017F COLORECTAL CA SCREEN DOC REV: CPT

## 2025-08-14 PROCEDURE — 99214 OFFICE O/P EST MOD 30 MIN: CPT

## 2025-08-14 PROCEDURE — 1159F MED LIST DOCD IN RCRD: CPT

## 2025-08-14 PROCEDURE — G8427 DOCREV CUR MEDS BY ELIG CLIN: HCPCS

## 2025-08-14 PROCEDURE — 1036F TOBACCO NON-USER: CPT

## 2025-08-14 PROCEDURE — 1090F PRES/ABSN URINE INCON ASSESS: CPT

## 2025-08-14 PROCEDURE — 1123F ACP DISCUSS/DSCN MKR DOCD: CPT

## 2025-08-14 PROCEDURE — 1126F AMNT PAIN NOTED NONE PRSNT: CPT

## 2025-08-14 PROCEDURE — G8400 PT W/DXA NO RESULTS DOC: HCPCS

## 2025-08-14 RX ORDER — DORZOLAMIDE HYDROCHLORIDE AND TIMOLOL MALEATE 20; 5 MG/ML; MG/ML
SOLUTION/ DROPS OPHTHALMIC
COMMUNITY
Start: 2025-08-05

## 2025-08-14 RX ORDER — ESOMEPRAZOLE MAGNESIUM 40 MG/1
40 CAPSULE, DELAYED RELEASE ORAL 2 TIMES DAILY
COMMUNITY
Start: 2025-08-04

## 2025-08-14 RX ORDER — PREGABALIN 100 MG/1
100 CAPSULE ORAL 3 TIMES DAILY
Qty: 90 CAPSULE | Refills: 3 | Status: SHIPPED | OUTPATIENT
Start: 2025-08-14 | End: 2025-12-12

## 2025-08-14 RX ORDER — BRIMONIDINE TARTRATE 2 MG/ML
SOLUTION/ DROPS OPHTHALMIC
COMMUNITY
Start: 2025-08-01

## (undated) DEVICE — DRAPE,REIN 53X77,STERILE: Brand: MEDLINE

## (undated) DEVICE — SUTURE VICRYL + SZ 3-0 L27IN ABSRB UD L26MM SH 1/2 CIR VCP416H

## (undated) DEVICE — GLOVE SURG SZ 65 L12IN FNGR THK126MIL CRM LTX FREE

## (undated) DEVICE — APPLICATOR MEDICATED 26 CC SOLUTION HI LT ORNG CHLORAPREP

## (undated) DEVICE — ELECTRODE PT RET AD L9FT HI MOIST COND ADH HYDRGEL CORDED

## (undated) DEVICE — SUTURE MONOCRYL + SZ 4-0 L27IN ABSRB UD L19MM PS-2 3/8 CIR MCP426H

## (undated) DEVICE — GLOVE ORANGE PI 7 1/2   MSG9075

## (undated) DEVICE — COVER LT HNDL PLAS RIG 1 PER PK

## (undated) DEVICE — Device

## (undated) DEVICE — GLOVE ORANGE PI 7   MSG9070

## (undated) DEVICE — GLOVE SURG SZ 75 L12IN FNGR THK79MIL GRN LTX FREE

## (undated) DEVICE — TOWEL,OR,DSP,ST,BLUE,STD,4/PK,20PK/CS: Brand: MEDLINE

## (undated) DEVICE — PENCIL ES CRD L10FT HND SWCHING ROCK SWCH W/ EDGE COAT BLDE